# Patient Record
Sex: MALE | Race: WHITE | Employment: FULL TIME | URBAN - METROPOLITAN AREA
[De-identification: names, ages, dates, MRNs, and addresses within clinical notes are randomized per-mention and may not be internally consistent; named-entity substitution may affect disease eponyms.]

---

## 2017-09-27 PROBLEM — Z98.890 HX OF MELANOMA EXCISION: Status: ACTIVE | Noted: 2017-09-27

## 2017-09-27 PROBLEM — Z85.820 HX OF MELANOMA EXCISION: Status: ACTIVE | Noted: 2017-09-27

## 2017-09-27 PROBLEM — I10 ESSENTIAL HYPERTENSION: Status: ACTIVE | Noted: 2017-09-27

## 2017-09-27 PROBLEM — G47.30 SLEEP APNEA: Status: ACTIVE | Noted: 2017-09-27

## 2017-09-28 ENCOUNTER — OFFICE VISIT (OUTPATIENT)
Dept: FAMILY MEDICINE CLINIC | Age: 52
End: 2017-09-28

## 2017-09-28 VITALS
HEART RATE: 51 BPM | OXYGEN SATURATION: 98 % | RESPIRATION RATE: 16 BRPM | DIASTOLIC BLOOD PRESSURE: 76 MMHG | BODY MASS INDEX: 41.16 KG/M2 | SYSTOLIC BLOOD PRESSURE: 124 MMHG | WEIGHT: 294 LBS | HEIGHT: 71 IN

## 2017-09-28 DIAGNOSIS — Z23 NEED FOR INFLUENZA VACCINATION: ICD-10-CM

## 2017-09-28 DIAGNOSIS — G47.30 SLEEP APNEA, UNSPECIFIED TYPE: ICD-10-CM

## 2017-09-28 DIAGNOSIS — M25.561 CHRONIC PAIN OF RIGHT KNEE: ICD-10-CM

## 2017-09-28 DIAGNOSIS — Z11.4 SCREENING FOR HIV WITHOUT PRESENCE OF RISK FACTORS: ICD-10-CM

## 2017-09-28 DIAGNOSIS — Z11.59 ENCOUNTER FOR HEPATITIS C SCREENING TEST FOR LOW RISK PATIENT: ICD-10-CM

## 2017-09-28 DIAGNOSIS — I10 ESSENTIAL HYPERTENSION: Primary | ICD-10-CM

## 2017-09-28 DIAGNOSIS — Z98.890 HX OF MELANOMA EXCISION: ICD-10-CM

## 2017-09-28 DIAGNOSIS — Z12.11 SCREEN FOR COLON CANCER: ICD-10-CM

## 2017-09-28 DIAGNOSIS — G89.29 CHRONIC PAIN OF RIGHT KNEE: ICD-10-CM

## 2017-09-28 DIAGNOSIS — H91.93 HIGH FREQUENCY HEARING LOSS, BILATERAL: ICD-10-CM

## 2017-09-28 DIAGNOSIS — Z85.820 HX OF MELANOMA EXCISION: ICD-10-CM

## 2017-09-28 PROBLEM — H91.90 HIGH FREQUENCY HEARING LOSS: Status: ACTIVE | Noted: 2017-09-28

## 2017-09-28 LAB
ALBUMIN SERPL-MCNC: 4.5 G/DL (ref 3.4–5)
ALP BLD-CCNC: 63 U/L (ref 40–129)
ALT SERPL-CCNC: 19 U/L (ref 10–40)
ANION GAP SERPL CALCULATED.3IONS-SCNC: 14 MMOL/L (ref 3–16)
AST SERPL-CCNC: 20 U/L (ref 15–37)
BILIRUB SERPL-MCNC: 1 MG/DL (ref 0–1)
BILIRUBIN DIRECT: <0.2 MG/DL (ref 0–0.3)
BILIRUBIN, INDIRECT: NORMAL MG/DL (ref 0–1)
BUN BLDV-MCNC: 18 MG/DL (ref 7–20)
CALCIUM SERPL-MCNC: 9.4 MG/DL (ref 8.3–10.6)
CHLORIDE BLD-SCNC: 97 MMOL/L (ref 99–110)
CHOLESTEROL, TOTAL: 126 MG/DL (ref 0–199)
CO2: 24 MMOL/L (ref 21–32)
CREAT SERPL-MCNC: 0.8 MG/DL (ref 0.9–1.3)
GFR AFRICAN AMERICAN: >60
GFR NON-AFRICAN AMERICAN: >60
GLUCOSE BLD-MCNC: 95 MG/DL (ref 70–99)
HCT VFR BLD CALC: 40.1 % (ref 40.5–52.5)
HDLC SERPL-MCNC: 40 MG/DL (ref 40–60)
HEMOGLOBIN: 14 G/DL (ref 13.5–17.5)
HEPATITIS C ANTIBODY INTERPRETATION: NORMAL
LDL CHOLESTEROL CALCULATED: 65 MG/DL
MCH RBC QN AUTO: 30.8 PG (ref 26–34)
MCHC RBC AUTO-ENTMCNC: 35 G/DL (ref 31–36)
MCV RBC AUTO: 88.1 FL (ref 80–100)
PDW BLD-RTO: 14.1 % (ref 12.4–15.4)
PLATELET # BLD: 247 K/UL (ref 135–450)
PMV BLD AUTO: 7.8 FL (ref 5–10.5)
POTASSIUM SERPL-SCNC: 4.2 MMOL/L (ref 3.5–5.1)
RBC # BLD: 4.55 M/UL (ref 4.2–5.9)
SODIUM BLD-SCNC: 135 MMOL/L (ref 136–145)
TOTAL PROTEIN: 7.5 G/DL (ref 6.4–8.2)
TRIGL SERPL-MCNC: 105 MG/DL (ref 0–150)
VLDLC SERPL CALC-MCNC: 21 MG/DL
WBC # BLD: 6.9 K/UL (ref 4–11)

## 2017-09-28 PROCEDURE — 90471 IMMUNIZATION ADMIN: CPT | Performed by: INTERNAL MEDICINE

## 2017-09-28 PROCEDURE — 90688 IIV4 VACCINE SPLT 0.5 ML IM: CPT | Performed by: INTERNAL MEDICINE

## 2017-09-28 PROCEDURE — 99214 OFFICE O/P EST MOD 30 MIN: CPT | Performed by: INTERNAL MEDICINE

## 2017-09-28 PROCEDURE — 92551 PURE TONE HEARING TEST AIR: CPT | Performed by: INTERNAL MEDICINE

## 2017-09-28 PROCEDURE — 36415 COLL VENOUS BLD VENIPUNCTURE: CPT | Performed by: INTERNAL MEDICINE

## 2017-09-28 RX ORDER — LISINOPRIL 20 MG/1
20 TABLET ORAL DAILY
Qty: 90 TABLET | Refills: 0 | Status: SHIPPED | OUTPATIENT
Start: 2017-09-28 | End: 2018-01-17 | Stop reason: SDUPTHER

## 2017-09-28 ASSESSMENT — PATIENT HEALTH QUESTIONNAIRE - PHQ9
SUM OF ALL RESPONSES TO PHQ9 QUESTIONS 1 & 2: 0
2. FEELING DOWN, DEPRESSED OR HOPELESS: 0
1. LITTLE INTEREST OR PLEASURE IN DOING THINGS: 0
SUM OF ALL RESPONSES TO PHQ QUESTIONS 1-9: 0

## 2017-09-29 LAB
ESTIMATED AVERAGE GLUCOSE: 108.3 MG/DL
HBA1C MFR BLD: 5.4 %
HIV-1 AND HIV-2 ANTIBODIES: NORMAL

## 2017-10-04 ENCOUNTER — NURSE ONLY (OUTPATIENT)
Dept: FAMILY MEDICINE CLINIC | Age: 52
End: 2017-10-04

## 2017-10-04 DIAGNOSIS — Z12.11 SCREEN FOR COLON CANCER: ICD-10-CM

## 2017-10-04 LAB
CONTROL: NORMAL
HEMOCCULT STL QL: NEGATIVE

## 2017-10-04 PROCEDURE — 82274 ASSAY TEST FOR BLOOD FECAL: CPT | Performed by: INTERNAL MEDICINE

## 2018-01-17 ENCOUNTER — OFFICE VISIT (OUTPATIENT)
Dept: FAMILY MEDICINE CLINIC | Age: 53
End: 2018-01-17

## 2018-01-17 VITALS
OXYGEN SATURATION: 95 % | WEIGHT: 299 LBS | HEART RATE: 61 BPM | RESPIRATION RATE: 16 BRPM | BODY MASS INDEX: 41.86 KG/M2 | HEIGHT: 71 IN | DIASTOLIC BLOOD PRESSURE: 90 MMHG | SYSTOLIC BLOOD PRESSURE: 140 MMHG

## 2018-01-17 DIAGNOSIS — M25.512 LEFT SHOULDER PAIN, UNSPECIFIED CHRONICITY: Primary | ICD-10-CM

## 2018-01-17 DIAGNOSIS — M54.12 CERVICAL RADICULOPATHY: ICD-10-CM

## 2018-01-17 DIAGNOSIS — Z98.890 HX OF MELANOMA EXCISION: ICD-10-CM

## 2018-01-17 DIAGNOSIS — G47.30 SLEEP APNEA, UNSPECIFIED TYPE: ICD-10-CM

## 2018-01-17 DIAGNOSIS — I10 ESSENTIAL HYPERTENSION: ICD-10-CM

## 2018-01-17 DIAGNOSIS — Z85.820 HX OF MELANOMA EXCISION: ICD-10-CM

## 2018-01-17 PROBLEM — M47.816 DEGENERATIVE JOINT DISEASE (DJD) OF LUMBAR SPINE: Status: ACTIVE | Noted: 2018-01-17

## 2018-01-17 PROCEDURE — 99213 OFFICE O/P EST LOW 20 MIN: CPT | Performed by: INTERNAL MEDICINE

## 2018-01-17 RX ORDER — MELOXICAM 15 MG/1
TABLET ORAL
Qty: 30 TABLET | Refills: 0 | Status: SHIPPED | OUTPATIENT
Start: 2018-01-17 | End: 2018-03-29

## 2018-01-17 RX ORDER — PREDNISONE 20 MG/1
TABLET ORAL
Qty: 4 TABLET | Refills: 0 | Status: SHIPPED | OUTPATIENT
Start: 2018-01-17 | End: 2018-03-29

## 2018-01-17 RX ORDER — LISINOPRIL 20 MG/1
20 TABLET ORAL DAILY
Qty: 90 TABLET | Refills: 0 | Status: SHIPPED | OUTPATIENT
Start: 2018-01-17 | End: 2018-03-29 | Stop reason: SDUPTHER

## 2018-01-17 NOTE — PROGRESS NOTES
HPI: Javy Brown presents for evaluation and management of left arm pain. Chronic health issues include history of melanoma, hypertension, sleep apnea, degenerative joint disease, obesity,    Noted the onset of left shoulder discomfort about 2 weeks ago. Pain was a 6-8 out of 10. Was worked with picking items up. No weakness. Did develop radicular pain along the ulnar aspect on the left. No acute trauma. Has had a neuropathy on the right associated with epicondylitis however has no elbow pain. He denies any neck pain. No dysarthria, headache, or weakness. No paresthesias elsewhere. Pain was improving until this morning when woken from sleep. No change in his sleeping position. Has not used any medicine for this. 10 days ago onset of head congestion and cough. Seems to be improving. Positive rhinorrhea. No fevers or chills. Taking Robitussin-DM. Melanoma  2010 with negative nodes and negative metastasis. Transient interferon treatment ×4. Follows up every 6 months with dermatology. Vidhi in November 2017. No current concerns. Hypertension 2005. Lisinopril 20 mg daily. No functional testing. Mild elevation of cholesterol. Positive CPAP and compliant. No chest pain palpitations. Mild dyslipidemia.     Sleep apnea 2006  compliant with machine. Knee pain intermittently. History of trauma and effusion. Tylenol when necessary. Knows to lose weight.      Weight 307 to 287 with 90 minutes of daily exercise and cutting back on portions. Is back up since stressful month. Has been eating more. Knows to get back on track. Positive knee strain and weight is up some. . Rare TENA, + sleep apnea. Left knee pain.           PMH:   sleep study      MEDS: lisinopril 20, vit d 4000 daily     Allergy percocet itch     SH: step son and wife's sister ( handicap). .  malignance tech. ( hx electrition)  No tobacco. No alcohol. No std concern. FH: +skin cancer,MGM liver cancer,maternal uncle ?  Colon cancer,  lung cancer, DM          No prostate cancer suicides or addictions.     Review of systems: Positive snoring and apnea. Decreased hearing on screening. Rhinitis. Rare GE reflux. Denies any wheezing pneumonias or abnormal chest x-rays. Positive shortness of breath with running. No bloody stools constipation or diarrhea. No kidney stones or known prostatism. No concerns with STD. Positive melanoma. No suspicious skin lesions today. Denies any spells with sadness or depression other than when he was on interferon therapy. Rare headaches. Positive allergies. DJD spine and coccygeal pain. 12 point ROS reviewed and negative other than mentioned above  Allergies   Allergen Reactions    Oxycodone-Acetaminophen Rash       Outpatient Prescriptions Marked as Taking for the 1/17/18 encounter (Office Visit) with Layne Monroy MD   Medication Sig Dispense Refill    lisinopril (PRINIVIL;ZESTRIL) 20 MG tablet Take 1 tablet by mouth daily 90 tablet 0    Cholecalciferol (VITAMIN D3) 5000 UNITS TABS Take by mouth               Past Medical History:   Diagnosis Date    Hx of melanoma excision 9/27/2017    Hypertension     Sleep apnea        No past surgical history on file. Social History     Social History    Marital status:      Spouse name: N/A    Number of children: N/A    Years of education: N/A     Occupational History    Not on file. Social History Main Topics    Smoking status: Never Smoker    Smokeless tobacco: Never Used    Alcohol use No    Drug use: Unknown    Sexual activity: Not on file     Other Topics Concern    Not on file     Social History Narrative    No narrative on file       No family history on file.         Review of Systems        Objective     BP (!) 140/80   Pulse 61   Resp 16   Ht 5' 11\" (1.803 m)   Wt 299 lb (135.6 kg)   SpO2 95% Comment: RA  BMI 41.70 kg/m²         Wt Readings from Last 3 Encounters:   01/17/18 299 lb (135.6 kg)   09/28/17 294 lb (133.4 kg) 06/22/16 (!) 307 lb (139.3 kg)       Physical Exam     NAD alert and cooperative Woman cough  HEENT: No cervical spine tenderness. Good range of motion of the neck. Throat small hypopharynx. No oral masses. Full neck. Lungs are clear. Good I:E ratio without any wheezes rales or rhonchi  Cardiovascular exam regular rate and rhythm without any murmur click  Positive obesity without any point tenderness or mass. Good range of motion of left shoulder. Mild tenderness to palpation before meals joint. No suspicious skin lesions. Supraspinatus tenderness. No pain in the epicondyles. Strength is 5 out of 5. DTRs are equal. No pronator drift. Dry fingers with cracking  No suspicious moles      Chemistry        Component Value Date/Time     (L) 09/28/2017 1608    K 4.2 09/28/2017 1608    CL 97 (L) 09/28/2017 1608    CO2 24 09/28/2017 1608    BUN 18 09/28/2017 1608    CREATININE 0.8 (L) 09/28/2017 1608        Component Value Date/Time    CALCIUM 9.4 09/28/2017 1608    ALKPHOS 63 09/28/2017 1608    AST 20 09/28/2017 1608    ALT 19 09/28/2017 1608    BILITOT 1.0 09/28/2017 1608            Lab Results   Component Value Date    WBC 6.9 09/28/2017    HGB 14.0 09/28/2017    HCT 40.1 (L) 09/28/2017    MCV 88.1 09/28/2017     09/28/2017     Lab Results   Component Value Date    LABA1C 5.4 09/28/2017     Lab Results   Component Value Date    .3 09/28/2017     Lab Results   Component Value Date    LABA1C 5.4 09/28/2017     No components found for: CHLPL  Lab Results   Component Value Date    TRIG 105 09/28/2017     Lab Results   Component Value Date    HDL 40 09/28/2017     Lab Results   Component Value Date    LDLCALC 65 09/28/2017     Lab Results   Component Value Date    LABVLDL 21 09/28/2017       Old labs and records reviewed or requested  Discussed past lab and studies with patient     1. Left shoulder pain, unspecified chronicity     2. Hx of melanoma excision     3. Sleep apnea, unspecified type     4. Essential hypertension  lisinopril (PRINIVIL;ZESTRIL) 20 MG tablet   5. Body mass index (BMI) of 40.0 to 44.9 in adult [facility])     6. Cervical radiculopathy       Left shoulder pain with apparent lower involvement. No weakness fasciculations or neurologic abnormality currently. We'll try a short course of steroids and nonsteroidal anti-inflammatory follow-up if not resolved. History melanoma. Following up with dermatology    Sleep apnea compliant    Elevated blood pressure today however is been recently ill. We'll recheck blood pressure in 2 weeks. Adjustment if still high. Obesity. We'll start with his weight loss and exercise in the near future    Return in about 2 weeks (around 1/31/2018), or bp check MA or me if still pain. Diagnosis and treatment discussed.   Possible side effects of medication reviewed  Patients questions answered  Follow up understood  Pt aware if they are not contacted about any test results , this does not mean they are normal.  They should call

## 2018-01-31 ENCOUNTER — NURSE ONLY (OUTPATIENT)
Dept: FAMILY MEDICINE CLINIC | Age: 53
End: 2018-01-31

## 2018-01-31 VITALS — DIASTOLIC BLOOD PRESSURE: 68 MMHG | SYSTOLIC BLOOD PRESSURE: 116 MMHG

## 2018-01-31 DIAGNOSIS — Z01.30 BP CHECK: Primary | ICD-10-CM

## 2018-01-31 NOTE — PROGRESS NOTES
Bp seen today as nurse visit for bp check. Patient taking 1 lisinopril at night. Bp was normal and advised to continue medication as prescribed.  Dr. Hiro Morgan informed bp normal.

## 2018-03-29 ENCOUNTER — OFFICE VISIT (OUTPATIENT)
Dept: FAMILY MEDICINE CLINIC | Age: 53
End: 2018-03-29

## 2018-03-29 VITALS
OXYGEN SATURATION: 96 % | DIASTOLIC BLOOD PRESSURE: 74 MMHG | HEIGHT: 71 IN | WEIGHT: 311 LBS | BODY MASS INDEX: 43.54 KG/M2 | SYSTOLIC BLOOD PRESSURE: 114 MMHG | RESPIRATION RATE: 12 BRPM | HEART RATE: 73 BPM

## 2018-03-29 DIAGNOSIS — G47.30 SLEEP APNEA, UNSPECIFIED TYPE: ICD-10-CM

## 2018-03-29 DIAGNOSIS — Z85.820 HX OF MELANOMA EXCISION: ICD-10-CM

## 2018-03-29 DIAGNOSIS — M25.512 PAIN IN JOINT OF LEFT SHOULDER: ICD-10-CM

## 2018-03-29 DIAGNOSIS — I10 ESSENTIAL HYPERTENSION: Primary | ICD-10-CM

## 2018-03-29 DIAGNOSIS — F43.21 ADJUSTMENT DISORDER WITH DEPRESSED MOOD: ICD-10-CM

## 2018-03-29 DIAGNOSIS — Z98.890 HX OF MELANOMA EXCISION: ICD-10-CM

## 2018-03-29 LAB
ANION GAP SERPL CALCULATED.3IONS-SCNC: 17 MMOL/L (ref 3–16)
BUN BLDV-MCNC: 20 MG/DL (ref 7–20)
CALCIUM SERPL-MCNC: 8.9 MG/DL (ref 8.3–10.6)
CHLORIDE BLD-SCNC: 100 MMOL/L (ref 99–110)
CO2: 22 MMOL/L (ref 21–32)
CREAT SERPL-MCNC: 0.8 MG/DL (ref 0.9–1.3)
GFR AFRICAN AMERICAN: >60
GFR NON-AFRICAN AMERICAN: >60
GLUCOSE BLD-MCNC: 106 MG/DL (ref 70–99)
POTASSIUM SERPL-SCNC: 4.2 MMOL/L (ref 3.5–5.1)
SODIUM BLD-SCNC: 139 MMOL/L (ref 136–145)

## 2018-03-29 PROCEDURE — 99214 OFFICE O/P EST MOD 30 MIN: CPT | Performed by: INTERNAL MEDICINE

## 2018-03-29 PROCEDURE — 36415 COLL VENOUS BLD VENIPUNCTURE: CPT | Performed by: INTERNAL MEDICINE

## 2018-03-29 RX ORDER — LISINOPRIL 20 MG/1
20 TABLET ORAL DAILY
Qty: 90 TABLET | Refills: 2 | Status: SHIPPED | OUTPATIENT
Start: 2018-03-29 | End: 2018-08-20
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
## 2018-03-29 NOTE — PATIENT INSTRUCTIONS
eating habits. · Manage stress. If you have a lot of stress in your life, it can be hard to focus on making healthy changes to your daily habits. · Track your food and activity. You are likely to do better at losing weight if you keep track of what you eat and what you do. Follow-up care is a key part of your treatment and safety. Be sure to make and go to all appointments, and call your doctor if you are having problems. It's also a good idea to know your test results and keep a list of the medicines you take. Where can you learn more? Go to https://FeeligopeAlohar Mobileeweb.AGILE customer insight. org and sign in to your [employer] account. Enter A121 in the [employer] box to learn more about \"Learning About Low-Carbohydrate Diets for Weight Loss. \"     If you do not have an account, please click on the \"Sign Up Now\" link. Current as of: May 12, 2017  Content Version: 11.5  © 4863-9713 Healthwise, Incorporated. Care instructions adapted under license by [facility] ([facility]). If you have questions about a medical condition or this instruction, always ask your healthcare professional. [place] [address] any warranty or liability for your use of this information.

## 2018-05-02 ENCOUNTER — OFFICE VISIT (OUTPATIENT)
Dept: FAMILY MEDICINE CLINIC | Age: 53
End: 2018-05-02

## 2018-05-02 VITALS
SYSTOLIC BLOOD PRESSURE: 129 MMHG | HEIGHT: 71 IN | WEIGHT: 310 LBS | DIASTOLIC BLOOD PRESSURE: 83 MMHG | BODY MASS INDEX: 43.4 KG/M2 | RESPIRATION RATE: 18 BRPM | OXYGEN SATURATION: 96 % | HEART RATE: 53 BPM

## 2018-05-02 DIAGNOSIS — Z85.820 HX OF MELANOMA EXCISION: ICD-10-CM

## 2018-05-02 DIAGNOSIS — Z98.890 HX OF MELANOMA EXCISION: ICD-10-CM

## 2018-05-02 DIAGNOSIS — G47.30 SLEEP APNEA, UNSPECIFIED TYPE: ICD-10-CM

## 2018-05-02 DIAGNOSIS — M47.816 OSTEOARTHRITIS OF LUMBAR SPINE, UNSPECIFIED SPINAL OSTEOARTHRITIS COMPLICATION STATUS: ICD-10-CM

## 2018-05-02 DIAGNOSIS — I10 ESSENTIAL HYPERTENSION: ICD-10-CM

## 2018-05-02 DIAGNOSIS — F43.21 ADJUSTMENT DISORDER WITH DEPRESSED MOOD: ICD-10-CM

## 2018-05-02 PROCEDURE — 99213 OFFICE O/P EST LOW 20 MIN: CPT | Performed by: INTERNAL MEDICINE

## 2018-05-02 RX ORDER — MELOXICAM 15 MG/1
TABLET ORAL
Qty: 30 TABLET | Refills: 2 | Status: SHIPPED | OUTPATIENT
Start: 2018-05-02 | End: 2019-01-30 | Stop reason: ALTCHOICE

## 2018-08-20 ENCOUNTER — OFFICE VISIT (OUTPATIENT)
Dept: FAMILY MEDICINE CLINIC | Age: 53
End: 2018-08-20

## 2018-08-20 VITALS
HEIGHT: 71 IN | RESPIRATION RATE: 16 BRPM | OXYGEN SATURATION: 97 % | WEIGHT: 314 LBS | DIASTOLIC BLOOD PRESSURE: 90 MMHG | BODY MASS INDEX: 43.96 KG/M2 | HEART RATE: 72 BPM | SYSTOLIC BLOOD PRESSURE: 138 MMHG

## 2018-08-20 DIAGNOSIS — Z12.11 SCREEN FOR COLON CANCER: ICD-10-CM

## 2018-08-20 DIAGNOSIS — Z98.890 HX OF MELANOMA EXCISION: ICD-10-CM

## 2018-08-20 DIAGNOSIS — Z85.820 HX OF MELANOMA EXCISION: ICD-10-CM

## 2018-08-20 DIAGNOSIS — M47.816 OSTEOARTHRITIS OF LUMBAR SPINE, UNSPECIFIED SPINAL OSTEOARTHRITIS COMPLICATION STATUS: ICD-10-CM

## 2018-08-20 DIAGNOSIS — G47.30 SLEEP APNEA, UNSPECIFIED TYPE: ICD-10-CM

## 2018-08-20 DIAGNOSIS — I10 ESSENTIAL HYPERTENSION: ICD-10-CM

## 2018-08-20 PROCEDURE — 99213 OFFICE O/P EST LOW 20 MIN: CPT | Performed by: INTERNAL MEDICINE

## 2018-08-20 RX ORDER — LISINOPRIL AND HYDROCHLOROTHIAZIDE 20; 12.5 MG/1; MG/1
1 TABLET ORAL DAILY
Qty: 30 TABLET | Refills: 0 | Status: SHIPPED | OUTPATIENT
Start: 2018-08-20 | End: 2018-09-05 | Stop reason: SDUPTHER

## 2018-08-20 NOTE — PROGRESS NOTES
CALCIUM 8.9 03/29/2018 0827    ALKPHOS 63 09/28/2017 1608    AST 20 09/28/2017 1608    ALT 19 09/28/2017 1608    BILITOT 1.0 09/28/2017 1608            Lab Results   Component Value Date    WBC 6.9 09/28/2017    HGB 14.0 09/28/2017    HCT 40.1 (L) 09/28/2017    MCV 88.1 09/28/2017     09/28/2017     Lab Results   Component Value Date    LABA1C 5.4 09/28/2017     Lab Results   Component Value Date    .3 09/28/2017     Lab Results   Component Value Date    LABA1C 5.4 09/28/2017     No components found for: CHLPL  Lab Results   Component Value Date    TRIG 105 09/28/2017     Lab Results   Component Value Date    HDL 40 09/28/2017     Lab Results   Component Value Date    LDLCALC 65 09/28/2017     Lab Results   Component Value Date    LABVLDL 21 09/28/2017       Old labs and records reviewed or requested  Discussed past lab and studies with patient      Diagnosis Orders   1. Body mass index (BMI) of 40.0 to 44.9 in adult [facility])     2. Hx of melanoma excision     3. Sleep apnea, unspecified type     4. Essential hypertension  Basic Metabolic Panel   5. Osteoarthritis of lumbar spine, unspecified spinal osteoarthritis complication status  Vitamin D 25 Hydroxy   6. Screen for colon cancer  POCT Fecal Immunochemical Test (FIT)       BMI 44. Advised on low carb diet and exercise decrease calories. Blood pressure not at goal today. Follow-up blood pressure check in 2 weeks on lisinopril hydrochlorothiazide. Laboratories to be obtained then. Sleep apnea compliant. Osteoarthritis lumbar spine. Low back exercises as needed physical therapy interest    Due for colon screening later this year. Card given. Return in about 2 weeks (around 9/3/2018), or lab and BP. Diagnosis and treatment discussed.   Possible side effects of medication reviewed  Patients questions answered  Follow up understood  Pt aware if they are not contacted about any test results , this does not mean they are normal.  They should call

## 2018-08-20 NOTE — PATIENT INSTRUCTIONS
Patient Education        Learning About Low-Carbohydrate Diets for Weight Loss  What is a low-carbohydrate diet? Low-carb diets avoid foods that are high in carbohydrate. These high-carb foods include pasta, bread, rice, cereal, fruits, and starchy vegetables. Instead, these diets usually have you eat foods that are high in fat and protein. Many people lose weight quickly on a low-carb diet. But the early weight loss is water. People on this diet often gain the weight back after they start eating carbs again. Not all diet plans are safe or work well. A lot of the evidence shows that low-carb diets aren't healthy. That's because these diets often don't include healthy foods like fruits and vegetables. Losing weight safely means balancing protein, fat, and carbs with every meal and snack. And low-carb diets don't always provide the vitamins, minerals, and fiber you need. If you have a serious medical condition, talk to your doctor before you try any diet. These conditions include kidney disease, heart disease, type 2 diabetes, high cholesterol, and high blood pressure. If you are pregnant, it may not be safe for your baby if you are on a low-carb diet. How can you lose weight safely? You might have heard that a diet plan helped another person lose weight. But that doesn't mean that it will work for you. It is very hard to stay on a diet that includes lots of big changes in your eating habits. If you want to get to a healthy weight and stay there, making healthy lifestyle changes will often work better than dieting. These steps can help. · Make a plan for change. Work with your doctor to create a plan that is right for you. · See a dietitian. He or she can show you how to make healthy changes in your eating habits. · Manage stress. If you have a lot of stress in your life, it can be hard to focus on making healthy changes to your daily habits. · Track your food and activity.  You are likely to do better at make and go to all appointments, and call your doctor if you are having problems. It's also a good idea to know your test results and keep a list of the medicines you take. How can you care for yourself at home? · Set realistic goals. Many people expect to lose much more weight than is likely. A weight loss of 5% to 10% of your body weight may be enough to improve your health. · Get family and friends involved to provide support. Talk to them about why you are trying to lose weight, and ask them to help. They can help by participating in exercise and having meals with you, even if they may be eating something different. · Find what works best for you. If you do not have time or do not like to cook, a program that offers meal replacement bars or shakes may be better for you. Or if you like to prepare meals, finding a plan that includes daily menus and recipes may be best.  · Ask your doctor about other health professionals who can help you achieve your weight loss goals. ¨ A dietitian can help you make healthy changes in your diet. ¨ An exercise specialist or  can help you develop a safe and effective exercise program.  ¨ A counselor or psychiatrist can help you cope with issues such as depression, anxiety, or family problems that can make it hard to focus on weight loss. · Consider joining a support group for people who are trying to lose weight. Your doctor can suggest groups in your area. Where can you learn more? Go to https://MobiciouscorkyTynker.Tembo Studio. org and sign in to your [employer] account. Enter N138 in the [facility] box to learn more about \"Starting a Weight Loss Plan: Care Instructions. \"     If you do not have an account, please click on the \"Sign Up Now\" link. Current as of: October 9, 2017  Content Version: 11.7  © 0320-0433 rumr, Incorporated. Care instructions adapted under license by [facility]  If you have questions about a medical condition or this

## 2018-09-04 ENCOUNTER — NURSE ONLY (OUTPATIENT)
Dept: FAMILY MEDICINE CLINIC | Age: 53
End: 2018-09-04

## 2018-09-04 VITALS — SYSTOLIC BLOOD PRESSURE: 138 MMHG | DIASTOLIC BLOOD PRESSURE: 82 MMHG

## 2018-09-04 DIAGNOSIS — Z12.11 SCREEN FOR COLON CANCER: ICD-10-CM

## 2018-09-04 DIAGNOSIS — I10 ESSENTIAL HYPERTENSION: ICD-10-CM

## 2018-09-04 DIAGNOSIS — M47.816 OSTEOARTHRITIS OF LUMBAR SPINE, UNSPECIFIED SPINAL OSTEOARTHRITIS COMPLICATION STATUS: ICD-10-CM

## 2018-09-04 LAB
ANION GAP SERPL CALCULATED.3IONS-SCNC: 16 MMOL/L (ref 3–16)
BUN BLDV-MCNC: 17 MG/DL (ref 7–20)
CALCIUM SERPL-MCNC: 9.4 MG/DL (ref 8.3–10.6)
CHLORIDE BLD-SCNC: 94 MMOL/L (ref 99–110)
CO2: 23 MMOL/L (ref 21–32)
CREAT SERPL-MCNC: 1 MG/DL (ref 0.9–1.3)
GFR AFRICAN AMERICAN: >60
GFR NON-AFRICAN AMERICAN: >60
GLUCOSE BLD-MCNC: 109 MG/DL (ref 70–99)
POTASSIUM SERPL-SCNC: 4 MMOL/L (ref 3.5–5.1)
SODIUM BLD-SCNC: 133 MMOL/L (ref 136–145)
VITAMIN D 25-HYDROXY: 37.9 NG/ML

## 2018-09-04 PROCEDURE — 36415 COLL VENOUS BLD VENIPUNCTURE: CPT | Performed by: INTERNAL MEDICINE

## 2018-09-05 RX ORDER — LISINOPRIL AND HYDROCHLOROTHIAZIDE 20; 12.5 MG/1; MG/1
1 TABLET ORAL DAILY
Qty: 90 TABLET | Refills: 2 | Status: SHIPPED | OUTPATIENT
Start: 2018-09-05 | End: 2018-09-06 | Stop reason: SDUPTHER

## 2018-09-06 RX ORDER — LISINOPRIL AND HYDROCHLOROTHIAZIDE 20; 12.5 MG/1; MG/1
1 TABLET ORAL DAILY
Qty: 90 TABLET | Refills: 2 | Status: SHIPPED | OUTPATIENT
Start: 2018-09-06 | End: 2019-09-10 | Stop reason: SDUPTHER

## 2019-01-16 ENCOUNTER — TELEPHONE (OUTPATIENT)
Dept: INTERNAL MEDICINE CLINIC | Age: 54
End: 2019-01-16

## 2019-01-30 ENCOUNTER — OFFICE VISIT (OUTPATIENT)
Dept: FAMILY MEDICINE CLINIC | Age: 54
End: 2019-01-30
Payer: COMMERCIAL

## 2019-01-30 VITALS
OXYGEN SATURATION: 98 % | HEIGHT: 71 IN | RESPIRATION RATE: 16 BRPM | SYSTOLIC BLOOD PRESSURE: 129 MMHG | BODY MASS INDEX: 43.12 KG/M2 | WEIGHT: 308 LBS | HEART RATE: 73 BPM | DIASTOLIC BLOOD PRESSURE: 85 MMHG

## 2019-01-30 DIAGNOSIS — I10 ESSENTIAL HYPERTENSION: ICD-10-CM

## 2019-01-30 DIAGNOSIS — K64.9 BLEEDING HEMORRHOID: Primary | ICD-10-CM

## 2019-01-30 DIAGNOSIS — Z85.820 HX OF MELANOMA EXCISION: ICD-10-CM

## 2019-01-30 DIAGNOSIS — G47.30 SLEEP APNEA, UNSPECIFIED TYPE: ICD-10-CM

## 2019-01-30 DIAGNOSIS — Z98.890 HX OF MELANOMA EXCISION: ICD-10-CM

## 2019-01-30 PROBLEM — F43.21 ADJUSTMENT DISORDER WITH DEPRESSED MOOD: Status: RESOLVED | Noted: 2018-05-02 | Resolved: 2019-01-30

## 2019-01-30 PROCEDURE — 99213 OFFICE O/P EST LOW 20 MIN: CPT | Performed by: INTERNAL MEDICINE

## 2019-01-30 RX ORDER — LIDOCAINE 50 MG/G
OINTMENT TOPICAL
Qty: 35.44 G | Refills: 1 | Status: SHIPPED | OUTPATIENT
Start: 2019-01-30 | End: 2021-04-26

## 2019-01-30 ASSESSMENT — PATIENT HEALTH QUESTIONNAIRE - PHQ9
SUM OF ALL RESPONSES TO PHQ9 QUESTIONS 1 & 2: 0
SUM OF ALL RESPONSES TO PHQ QUESTIONS 1-9: 0
1. LITTLE INTEREST OR PLEASURE IN DOING THINGS: 0
SUM OF ALL RESPONSES TO PHQ QUESTIONS 1-9: 0
2. FEELING DOWN, DEPRESSED OR HOPELESS: 0

## 2019-02-06 ENCOUNTER — OFFICE VISIT (OUTPATIENT)
Dept: FAMILY MEDICINE CLINIC | Age: 54
End: 2019-02-06
Payer: COMMERCIAL

## 2019-02-06 VITALS
OXYGEN SATURATION: 96 % | HEIGHT: 71 IN | HEART RATE: 65 BPM | DIASTOLIC BLOOD PRESSURE: 80 MMHG | WEIGHT: 308 LBS | BODY MASS INDEX: 43.12 KG/M2 | RESPIRATION RATE: 16 BRPM | SYSTOLIC BLOOD PRESSURE: 123 MMHG

## 2019-02-06 DIAGNOSIS — M25.511 ACUTE PAIN OF RIGHT SHOULDER: Primary | ICD-10-CM

## 2019-02-06 DIAGNOSIS — K64.4 EXTERNAL HEMORRHOID: ICD-10-CM

## 2019-02-06 DIAGNOSIS — I10 ESSENTIAL HYPERTENSION: ICD-10-CM

## 2019-02-06 PROCEDURE — 99213 OFFICE O/P EST LOW 20 MIN: CPT | Performed by: INTERNAL MEDICINE

## 2019-02-06 RX ORDER — MELOXICAM 15 MG/1
TABLET ORAL
Qty: 30 TABLET | Refills: 0 | Status: SHIPPED | OUTPATIENT
Start: 2019-02-06 | End: 2019-04-25 | Stop reason: ALTCHOICE

## 2019-04-25 ENCOUNTER — HOSPITAL ENCOUNTER (OUTPATIENT)
Dept: GENERAL RADIOLOGY | Age: 54
Discharge: HOME | End: 2019-04-25
Payer: COMMERCIAL

## 2019-04-25 ENCOUNTER — OFFICE VISIT (OUTPATIENT)
Dept: FAMILY MEDICINE CLINIC | Age: 54
End: 2019-04-25
Payer: COMMERCIAL

## 2019-04-25 ENCOUNTER — HOSPITAL ENCOUNTER (OUTPATIENT)
Age: 54
Discharge: HOME | End: 2019-04-25
Payer: COMMERCIAL

## 2019-04-25 VITALS
HEIGHT: 71 IN | OXYGEN SATURATION: 95 % | HEART RATE: 60 BPM | WEIGHT: 315 LBS | DIASTOLIC BLOOD PRESSURE: 75 MMHG | RESPIRATION RATE: 18 BRPM | BODY MASS INDEX: 44.1 KG/M2 | SYSTOLIC BLOOD PRESSURE: 111 MMHG

## 2019-04-25 DIAGNOSIS — M47.816 OSTEOARTHRITIS OF LUMBAR SPINE, UNSPECIFIED SPINAL OSTEOARTHRITIS COMPLICATION STATUS: ICD-10-CM

## 2019-04-25 DIAGNOSIS — R07.9 CHEST PAIN, UNSPECIFIED TYPE: Primary | ICD-10-CM

## 2019-04-25 DIAGNOSIS — R07.9 CHEST PAIN, UNSPECIFIED TYPE: ICD-10-CM

## 2019-04-25 DIAGNOSIS — I10 ESSENTIAL HYPERTENSION: ICD-10-CM

## 2019-04-25 DIAGNOSIS — Z98.890 HX OF MELANOMA EXCISION: ICD-10-CM

## 2019-04-25 DIAGNOSIS — R73.03 PREDIABETES: ICD-10-CM

## 2019-04-25 DIAGNOSIS — G47.30 SLEEP APNEA, UNSPECIFIED TYPE: ICD-10-CM

## 2019-04-25 DIAGNOSIS — Z85.820 HX OF MELANOMA EXCISION: ICD-10-CM

## 2019-04-25 LAB
ALBUMIN SERPL-MCNC: 4.3 G/DL (ref 3.4–5)
ALP BLD-CCNC: 64 U/L (ref 40–129)
ALT SERPL-CCNC: 37 U/L (ref 10–40)
ANION GAP SERPL CALCULATED.3IONS-SCNC: 13 MMOL/L (ref 3–16)
AST SERPL-CCNC: 30 U/L (ref 15–37)
BASOPHILS ABSOLUTE: 0.1 K/UL (ref 0–0.2)
BASOPHILS RELATIVE PERCENT: 0.9 %
BILIRUB SERPL-MCNC: 0.5 MG/DL (ref 0–1)
BILIRUBIN DIRECT: <0.2 MG/DL (ref 0–0.3)
BILIRUBIN, INDIRECT: NORMAL MG/DL (ref 0–1)
BUN BLDV-MCNC: 18 MG/DL (ref 7–20)
CALCIUM SERPL-MCNC: 9.4 MG/DL (ref 8.3–10.6)
CHLORIDE BLD-SCNC: 98 MMOL/L (ref 99–110)
CO2: 24 MMOL/L (ref 21–32)
CREAT SERPL-MCNC: 1 MG/DL (ref 0.9–1.3)
EOSINOPHILS ABSOLUTE: 0.1 K/UL (ref 0–0.6)
EOSINOPHILS RELATIVE PERCENT: 1.7 %
GFR AFRICAN AMERICAN: >60
GFR NON-AFRICAN AMERICAN: >60
GLUCOSE BLD-MCNC: 121 MG/DL (ref 70–99)
HCT VFR BLD CALC: 40.1 % (ref 40.5–52.5)
HEMOGLOBIN: 14 G/DL (ref 13.5–17.5)
LYMPHOCYTES ABSOLUTE: 2 K/UL (ref 1–5.1)
LYMPHOCYTES RELATIVE PERCENT: 30.5 %
MCH RBC QN AUTO: 30.6 PG (ref 26–34)
MCHC RBC AUTO-ENTMCNC: 34.9 G/DL (ref 31–36)
MCV RBC AUTO: 87.6 FL (ref 80–100)
MONOCYTES ABSOLUTE: 0.4 K/UL (ref 0–1.3)
MONOCYTES RELATIVE PERCENT: 5.7 %
NEUTROPHILS ABSOLUTE: 4 K/UL (ref 1.7–7.7)
NEUTROPHILS RELATIVE PERCENT: 61.2 %
PDW BLD-RTO: 14.3 % (ref 12.4–15.4)
PLATELET # BLD: 273 K/UL (ref 135–450)
PMV BLD AUTO: 7.2 FL (ref 5–10.5)
POTASSIUM SERPL-SCNC: 3.8 MMOL/L (ref 3.5–5.1)
RBC # BLD: 4.58 M/UL (ref 4.2–5.9)
SODIUM BLD-SCNC: 135 MMOL/L (ref 136–145)
TOTAL PROTEIN: 7.4 G/DL (ref 6.4–8.2)
WBC # BLD: 6.6 K/UL (ref 4–11)

## 2019-04-25 PROCEDURE — 99213 OFFICE O/P EST LOW 20 MIN: CPT | Performed by: INTERNAL MEDICINE

## 2019-04-25 PROCEDURE — 71046 X-RAY EXAM CHEST 2 VIEWS: CPT

## 2019-04-25 PROCEDURE — 36415 COLL VENOUS BLD VENIPUNCTURE: CPT | Performed by: INTERNAL MEDICINE

## 2019-04-25 RX ORDER — RANITIDINE 300 MG/1
300 TABLET ORAL NIGHTLY
Qty: 30 TABLET | Refills: 0 | Status: SHIPPED | OUTPATIENT
Start: 2019-04-25 | End: 2019-10-28

## 2019-04-25 NOTE — PATIENT INSTRUCTIONS
Start zantac nightly  Get chest xray  Back to the gym.   Follow up 1 month on medication  If worsening, fever, cough up blood need call  Try to cut back on calories
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
## 2019-04-26 LAB
ESTIMATED AVERAGE GLUCOSE: 119.8 MG/DL
HBA1C MFR BLD: 5.8 %

## 2019-05-22 ENCOUNTER — OFFICE VISIT (OUTPATIENT)
Dept: FAMILY MEDICINE CLINIC | Age: 54
End: 2019-05-22
Payer: COMMERCIAL

## 2019-05-22 VITALS
BODY MASS INDEX: 43.82 KG/M2 | DIASTOLIC BLOOD PRESSURE: 73 MMHG | HEART RATE: 52 BPM | RESPIRATION RATE: 16 BRPM | SYSTOLIC BLOOD PRESSURE: 109 MMHG | HEIGHT: 71 IN | OXYGEN SATURATION: 96 % | WEIGHT: 313 LBS

## 2019-05-22 DIAGNOSIS — I10 ESSENTIAL HYPERTENSION: ICD-10-CM

## 2019-05-22 DIAGNOSIS — G47.30 SLEEP APNEA, UNSPECIFIED TYPE: ICD-10-CM

## 2019-05-22 DIAGNOSIS — M54.6 CHRONIC RIGHT-SIDED THORACIC BACK PAIN: ICD-10-CM

## 2019-05-22 DIAGNOSIS — Z85.820 HX OF MELANOMA EXCISION: ICD-10-CM

## 2019-05-22 DIAGNOSIS — Z98.890 HX OF MELANOMA EXCISION: ICD-10-CM

## 2019-05-22 DIAGNOSIS — G89.29 CHRONIC RIGHT-SIDED THORACIC BACK PAIN: ICD-10-CM

## 2019-05-22 DIAGNOSIS — M47.816 OSTEOARTHRITIS OF LUMBAR SPINE, UNSPECIFIED SPINAL OSTEOARTHRITIS COMPLICATION STATUS: ICD-10-CM

## 2019-05-22 DIAGNOSIS — R73.03 PREDIABETES: ICD-10-CM

## 2019-05-22 DIAGNOSIS — R10.33 PERIUMBILICAL ABDOMINAL PAIN: ICD-10-CM

## 2019-05-22 PROCEDURE — 99213 OFFICE O/P EST LOW 20 MIN: CPT | Performed by: INTERNAL MEDICINE
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
## 2019-06-07 ENCOUNTER — HOSPITAL ENCOUNTER (OUTPATIENT)
Dept: CT IMAGING | Age: 54
Discharge: HOME | End: 2019-06-07
Payer: COMMERCIAL

## 2019-06-07 DIAGNOSIS — G89.29 CHRONIC RIGHT-SIDED THORACIC BACK PAIN: ICD-10-CM

## 2019-06-07 DIAGNOSIS — M54.6 CHRONIC RIGHT-SIDED THORACIC BACK PAIN: ICD-10-CM

## 2019-06-07 DIAGNOSIS — Z85.820 HX OF MELANOMA EXCISION: ICD-10-CM

## 2019-06-07 DIAGNOSIS — Z98.890 HX OF MELANOMA EXCISION: ICD-10-CM

## 2019-06-07 PROCEDURE — 74177 CT ABD & PELVIS W/CONTRAST: CPT

## 2019-06-07 PROCEDURE — 6360000004 HC RX CONTRAST MEDICATION: Performed by: INTERNAL MEDICINE

## 2019-06-07 RX ADMIN — IOHEXOL 50 ML: 240 INJECTION, SOLUTION INTRATHECAL; INTRAVASCULAR; INTRAVENOUS; ORAL at 08:51

## 2019-06-07 RX ADMIN — IOPAMIDOL 75 ML: 755 INJECTION, SOLUTION INTRAVENOUS at 08:51

## 2019-09-10 RX ORDER — LISINOPRIL AND HYDROCHLOROTHIAZIDE 20; 12.5 MG/1; MG/1
1 TABLET ORAL DAILY
Qty: 90 TABLET | Refills: 0 | Status: SHIPPED | OUTPATIENT
Start: 2019-09-10 | End: 2019-12-13 | Stop reason: SDUPTHER

## 2019-10-18 ENCOUNTER — TELEPHONE (OUTPATIENT)
Dept: FAMILY MEDICINE CLINIC | Age: 54
End: 2019-10-18

## 2019-10-28 ENCOUNTER — NURSE ONLY (OUTPATIENT)
Dept: FAMILY MEDICINE CLINIC | Age: 54
End: 2019-10-28
Payer: COMMERCIAL

## 2019-10-28 ENCOUNTER — OFFICE VISIT (OUTPATIENT)
Dept: FAMILY MEDICINE CLINIC | Age: 54
End: 2019-10-28
Payer: COMMERCIAL

## 2019-10-28 VITALS
WEIGHT: 307 LBS | OXYGEN SATURATION: 94 % | HEIGHT: 71 IN | HEART RATE: 56 BPM | RESPIRATION RATE: 18 BRPM | DIASTOLIC BLOOD PRESSURE: 67 MMHG | BODY MASS INDEX: 42.98 KG/M2 | SYSTOLIC BLOOD PRESSURE: 99 MMHG

## 2019-10-28 DIAGNOSIS — I10 ESSENTIAL HYPERTENSION: Primary | ICD-10-CM

## 2019-10-28 DIAGNOSIS — M54.6 CHRONIC RIGHT-SIDED THORACIC BACK PAIN: ICD-10-CM

## 2019-10-28 DIAGNOSIS — R73.03 PREDIABETES: ICD-10-CM

## 2019-10-28 DIAGNOSIS — Z12.11 SCREEN FOR COLON CANCER: ICD-10-CM

## 2019-10-28 DIAGNOSIS — Z98.890 HX OF MELANOMA EXCISION: ICD-10-CM

## 2019-10-28 DIAGNOSIS — R91.8 PULMONARY NODULES: ICD-10-CM

## 2019-10-28 DIAGNOSIS — G89.29 CHRONIC RIGHT-SIDED THORACIC BACK PAIN: ICD-10-CM

## 2019-10-28 DIAGNOSIS — Z85.820 HX OF MELANOMA EXCISION: ICD-10-CM

## 2019-10-28 DIAGNOSIS — R19.5 POSITIVE FECAL OCCULT BLOOD TEST: Primary | ICD-10-CM

## 2019-10-28 LAB
ANION GAP SERPL CALCULATED.3IONS-SCNC: 16 MMOL/L (ref 3–16)
BUN BLDV-MCNC: 18 MG/DL (ref 7–20)
CALCIUM SERPL-MCNC: 9.3 MG/DL (ref 8.3–10.6)
CHLORIDE BLD-SCNC: 100 MMOL/L (ref 99–110)
CO2: 22 MMOL/L (ref 21–32)
CONTROL: ABNORMAL
CREAT SERPL-MCNC: 0.9 MG/DL (ref 0.9–1.3)
GFR AFRICAN AMERICAN: >60
GFR NON-AFRICAN AMERICAN: >60
GLUCOSE BLD-MCNC: 116 MG/DL (ref 70–99)
HCT VFR BLD CALC: 39.3 % (ref 40.5–52.5)
HEMOCCULT STL QL: POSITIVE
HEMOGLOBIN: 13.9 G/DL (ref 13.5–17.5)
MCH RBC QN AUTO: 31.6 PG (ref 26–34)
MCHC RBC AUTO-ENTMCNC: 35.4 G/DL (ref 31–36)
MCV RBC AUTO: 89.4 FL (ref 80–100)
PDW BLD-RTO: 14 % (ref 12.4–15.4)
PLATELET # BLD: 242 K/UL (ref 135–450)
PMV BLD AUTO: 7.2 FL (ref 5–10.5)
POTASSIUM SERPL-SCNC: 4 MMOL/L (ref 3.5–5.1)
RBC # BLD: 4.4 M/UL (ref 4.2–5.9)
SODIUM BLD-SCNC: 138 MMOL/L (ref 136–145)
WBC # BLD: 5.7 K/UL (ref 4–11)

## 2019-10-28 PROCEDURE — 99214 OFFICE O/P EST MOD 30 MIN: CPT | Performed by: INTERNAL MEDICINE

## 2019-10-28 PROCEDURE — 36415 COLL VENOUS BLD VENIPUNCTURE: CPT | Performed by: INTERNAL MEDICINE

## 2019-10-28 PROCEDURE — 82274 ASSAY TEST FOR BLOOD FECAL: CPT | Performed by: INTERNAL MEDICINE

## 2019-12-06 ENCOUNTER — ANESTHESIA EVENT (OUTPATIENT)
Dept: ENDOSCOPY | Age: 54
End: 2019-12-06
Payer: COMMERCIAL

## 2019-12-09 ENCOUNTER — ANESTHESIA (OUTPATIENT)
Dept: ENDOSCOPY | Age: 54
End: 2019-12-09
Payer: COMMERCIAL

## 2019-12-09 ENCOUNTER — HOSPITAL ENCOUNTER (OUTPATIENT)
Age: 54
Setting detail: OUTPATIENT SURGERY
Discharge: HOME | End: 2019-12-09
Attending: INTERNAL MEDICINE | Admitting: INTERNAL MEDICINE
Payer: COMMERCIAL

## 2019-12-09 VITALS
SYSTOLIC BLOOD PRESSURE: 133 MMHG | WEIGHT: 313.49 LBS | OXYGEN SATURATION: 97 % | HEIGHT: 70 IN | BODY MASS INDEX: 44.88 KG/M2 | RESPIRATION RATE: 16 BRPM | HEART RATE: 64 BPM | TEMPERATURE: 97.7 F | DIASTOLIC BLOOD PRESSURE: 85 MMHG

## 2019-12-09 VITALS
RESPIRATION RATE: 21 BRPM | OXYGEN SATURATION: 96 % | DIASTOLIC BLOOD PRESSURE: 68 MMHG | SYSTOLIC BLOOD PRESSURE: 109 MMHG

## 2019-12-09 DIAGNOSIS — Z12.11 COLON CANCER SCREENING: ICD-10-CM

## 2019-12-09 DIAGNOSIS — R19.5 POSITIVE OCCULT STOOL BLOOD TEST: ICD-10-CM

## 2019-12-09 PROBLEM — Z86.010 HX OF COLONIC POLYPS: Status: ACTIVE | Noted: 2019-10-28

## 2019-12-09 PROBLEM — Z86.0100 HX OF COLONIC POLYPS: Status: ACTIVE | Noted: 2019-10-28

## 2019-12-09 PROCEDURE — 2500000003 HC RX 250 WO HCPCS: Performed by: NURSE ANESTHETIST, CERTIFIED REGISTERED

## 2019-12-09 PROCEDURE — 3609010600 HC COLONOSCOPY POLYPECTOMY SNARE/COLD BIOPSY: Performed by: INTERNAL MEDICINE

## 2019-12-09 PROCEDURE — 7100000010 HC PHASE II RECOVERY - FIRST 15 MIN: Performed by: INTERNAL MEDICINE

## 2019-12-09 PROCEDURE — 7100000011 HC PHASE II RECOVERY - ADDTL 15 MIN: Performed by: INTERNAL MEDICINE

## 2019-12-09 PROCEDURE — 7100000001 HC PACU RECOVERY - ADDTL 15 MIN: Performed by: INTERNAL MEDICINE

## 2019-12-09 PROCEDURE — 88305 TISSUE EXAM BY PATHOLOGIST: CPT

## 2019-12-09 PROCEDURE — 3700000000 HC ANESTHESIA ATTENDED CARE: Performed by: INTERNAL MEDICINE

## 2019-12-09 PROCEDURE — 6360000002 HC RX W HCPCS: Performed by: NURSE ANESTHETIST, CERTIFIED REGISTERED

## 2019-12-09 PROCEDURE — 3700000001 HC ADD 15 MINUTES (ANESTHESIA): Performed by: INTERNAL MEDICINE

## 2019-12-09 PROCEDURE — 2709999900 HC NON-CHARGEABLE SUPPLY: Performed by: INTERNAL MEDICINE

## 2019-12-09 PROCEDURE — 2580000003 HC RX 258: Performed by: NURSE ANESTHETIST, CERTIFIED REGISTERED

## 2019-12-09 PROCEDURE — 7100000000 HC PACU RECOVERY - FIRST 15 MIN: Performed by: INTERNAL MEDICINE

## 2019-12-09 PROCEDURE — 2500000003 HC RX 250 WO HCPCS: Performed by: ANESTHESIOLOGY

## 2019-12-09 RX ORDER — SODIUM CHLORIDE 9 MG/ML
INJECTION, SOLUTION INTRAVENOUS CONTINUOUS PRN
Status: DISCONTINUED | OUTPATIENT
Start: 2019-12-09 | End: 2019-12-09 | Stop reason: SDUPTHER

## 2019-12-09 RX ORDER — SODIUM CHLORIDE 9 MG/ML
INJECTION, SOLUTION INTRAVENOUS CONTINUOUS
Status: DISCONTINUED | OUTPATIENT
Start: 2019-12-09 | End: 2019-12-09 | Stop reason: HOSPADM

## 2019-12-09 RX ORDER — PROPOFOL 10 MG/ML
INJECTION, EMULSION INTRAVENOUS CONTINUOUS PRN
Status: DISCONTINUED | OUTPATIENT
Start: 2019-12-09 | End: 2019-12-09 | Stop reason: SDUPTHER

## 2019-12-09 RX ORDER — SODIUM CHLORIDE 0.9 % (FLUSH) 0.9 %
10 SYRINGE (ML) INJECTION PRN
Status: DISCONTINUED | OUTPATIENT
Start: 2019-12-09 | End: 2019-12-09 | Stop reason: HOSPADM

## 2019-12-09 RX ORDER — PROPOFOL 10 MG/ML
INJECTION, EMULSION INTRAVENOUS PRN
Status: DISCONTINUED | OUTPATIENT
Start: 2019-12-09 | End: 2019-12-09 | Stop reason: SDUPTHER

## 2019-12-09 RX ORDER — SODIUM CHLORIDE 0.9 % (FLUSH) 0.9 %
10 SYRINGE (ML) INJECTION EVERY 12 HOURS SCHEDULED
Status: DISCONTINUED | OUTPATIENT
Start: 2019-12-09 | End: 2019-12-09 | Stop reason: HOSPADM

## 2019-12-09 RX ORDER — LIDOCAINE HYDROCHLORIDE 20 MG/ML
INJECTION, SOLUTION EPIDURAL; INFILTRATION; INTRACAUDAL; PERINEURAL PRN
Status: DISCONTINUED | OUTPATIENT
Start: 2019-12-09 | End: 2019-12-09 | Stop reason: SDUPTHER

## 2019-12-09 RX ORDER — ONDANSETRON 2 MG/ML
4 INJECTION INTRAMUSCULAR; INTRAVENOUS
Status: DISCONTINUED | OUTPATIENT
Start: 2019-12-09 | End: 2019-12-09 | Stop reason: HOSPADM

## 2019-12-09 RX ADMIN — FAMOTIDINE 20 MG: 10 INJECTION, SOLUTION INTRAVENOUS at 07:38

## 2019-12-09 RX ADMIN — PROPOFOL 90 MG: 10 INJECTION, EMULSION INTRAVENOUS at 08:07

## 2019-12-09 RX ADMIN — PROPOFOL 140 MCG/KG/MIN: 10 INJECTION, EMULSION INTRAVENOUS at 08:07

## 2019-12-09 RX ADMIN — LIDOCAINE HYDROCHLORIDE 60 MG: 20 INJECTION, SOLUTION EPIDURAL; INFILTRATION; INTRACAUDAL; PERINEURAL at 08:07

## 2019-12-09 RX ADMIN — SODIUM CHLORIDE: 9 INJECTION, SOLUTION INTRAVENOUS at 07:34

## 2019-12-09 ASSESSMENT — PULMONARY FUNCTION TESTS
PIF_VALUE: 0

## 2019-12-09 ASSESSMENT — LIFESTYLE VARIABLES: SMOKING_STATUS: 0

## 2019-12-09 ASSESSMENT — PAIN SCALES - GENERAL
PAINLEVEL_OUTOF10: 0

## 2019-12-09 ASSESSMENT — ENCOUNTER SYMPTOMS: SHORTNESS OF BREATH: 0

## 2019-12-09 ASSESSMENT — PAIN - FUNCTIONAL ASSESSMENT: PAIN_FUNCTIONAL_ASSESSMENT: 0-10

## 2020-03-11 ENCOUNTER — OFFICE VISIT (OUTPATIENT)
Dept: FAMILY MEDICINE CLINIC | Age: 55
End: 2020-03-11
Payer: COMMERCIAL

## 2020-03-11 VITALS
HEART RATE: 71 BPM | BODY MASS INDEX: 44.98 KG/M2 | RESPIRATION RATE: 12 BRPM | SYSTOLIC BLOOD PRESSURE: 138 MMHG | DIASTOLIC BLOOD PRESSURE: 88 MMHG | TEMPERATURE: 97.8 F | HEIGHT: 70 IN

## 2020-03-11 LAB
ANION GAP SERPL CALCULATED.3IONS-SCNC: 12 MMOL/L (ref 3–16)
BUN BLDV-MCNC: 18 MG/DL (ref 7–20)
CALCIUM SERPL-MCNC: 9.7 MG/DL (ref 8.3–10.6)
CHLORIDE BLD-SCNC: 101 MMOL/L (ref 99–110)
CO2: 23 MMOL/L (ref 21–32)
CREAT SERPL-MCNC: 0.8 MG/DL (ref 0.9–1.3)
ESTIMATED AVERAGE GLUCOSE: 108.3 MG/DL
GFR AFRICAN AMERICAN: >60
GFR NON-AFRICAN AMERICAN: >60
GLUCOSE BLD-MCNC: 113 MG/DL (ref 70–99)
HBA1C MFR BLD: 5.4 %
POTASSIUM SERPL-SCNC: 4.3 MMOL/L (ref 3.5–5.1)
SEDIMENTATION RATE, ERYTHROCYTE: 14 MM/HR (ref 0–20)
SODIUM BLD-SCNC: 136 MMOL/L (ref 136–145)

## 2020-03-11 PROCEDURE — 99214 OFFICE O/P EST MOD 30 MIN: CPT | Performed by: INTERNAL MEDICINE

## 2020-03-11 PROCEDURE — 36415 COLL VENOUS BLD VENIPUNCTURE: CPT | Performed by: INTERNAL MEDICINE

## 2020-03-11 RX ORDER — TIZANIDINE 4 MG/1
TABLET ORAL
Qty: 30 TABLET | Refills: 1 | Status: SHIPPED | OUTPATIENT
Start: 2020-03-11 | End: 2020-10-26

## 2020-03-11 RX ORDER — CELECOXIB 200 MG/1
CAPSULE ORAL
Qty: 30 CAPSULE | Refills: 1 | Status: SHIPPED | OUTPATIENT
Start: 2020-03-11 | End: 2020-04-07

## 2020-03-11 ASSESSMENT — PATIENT HEALTH QUESTIONNAIRE - PHQ9
2. FEELING DOWN, DEPRESSED OR HOPELESS: 0
SUM OF ALL RESPONSES TO PHQ QUESTIONS 1-9: 0
SUM OF ALL RESPONSES TO PHQ9 QUESTIONS 1 & 2: 0
1. LITTLE INTEREST OR PLEASURE IN DOING THINGS: 0
SUM OF ALL RESPONSES TO PHQ QUESTIONS 1-9: 0
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
## 2020-04-02 ENCOUNTER — TELEPHONE (OUTPATIENT)
Dept: FAMILY MEDICINE CLINIC | Age: 55
End: 2020-04-02

## 2020-04-07 RX ORDER — CELECOXIB 200 MG/1
CAPSULE ORAL
Qty: 30 CAPSULE | Refills: 1 | Status: SHIPPED | OUTPATIENT
Start: 2020-04-07 | End: 2020-05-28 | Stop reason: SDUPTHER

## 2020-04-24 ENCOUNTER — TELEPHONE (OUTPATIENT)
Dept: FAMILY MEDICINE CLINIC | Age: 55
End: 2020-04-24

## 2020-04-24 NOTE — TELEPHONE ENCOUNTER
Kenisha Reynolds with [facility] called requesting an updated order for physical therapy. They  after 30 days and the last one was put in back in march.      Best call back number: [phone]

## 2020-04-27 ENCOUNTER — TELEPHONE (OUTPATIENT)
Dept: FAMILY MEDICINE CLINIC | Age: 55
End: 2020-04-27

## 2020-04-27 NOTE — TELEPHONE ENCOUNTER
Called on Friday for updated order for PT. Unable to take verbal. Needs new order put in epic. Please advise.  Best call back number is [phone]

## 2020-04-27 NOTE — PROGRESS NOTES
Patient being evaluated by a Virtual Visit (video visit) encounter to address concerns as mentioned below. A caregiver was present when appropriate. Due to this being a TeleHealth encounter (During [facility]-37 public health emergency), evaluation of the following organ systems was limited: Vitals/Constitutional/EENT/Resp/CV/GI//MS/Neuro/Skin/Heme-Lymph-Imm. Pursuant to the emergency declaration under the [facility], [address] [place] [address] and the [employer] Act, this Virtual Visit was conducted with patient's (and/or legal guardian's) consent, to reduce the patient's risk of exposure to COVID-19 and provide necessary medical care. The patient (and/or legal guardian) has also been advised to contact this office for worsening conditions or problems, and seek emergency medical treatment and/or call 911 if deemed necessary. Services were provided through a video synchronous discussion virtually to substitute for in-person clinic visit. HPI: Eze Leon presents for evaluation and management of HTN    Chronic health issues include osteoarthritis, negative node melanoma, hypertension, obesity, sleep apnea, prediabetes, adenomatous polyps, chronic thoracic pain. DJD spine      2005 hypertension. Compliant with medication no chest pain palpitations lower extremity edema or new exercise intolerance.  Resting bradycardia without near syncope or palpitations.  Mild hyperlipidemia. Last blood pressure 140s over 88. Has not checked his blood pressure at home. Does have a cuff. Abscessed tooth. Amoxicillin. Following with dentist.     Greater than 10 years of intermittent low back pain. MRI of thoracic and lumbar spine with osteoarthritis. Goes to chiropractor has intermittent flares. Motrin discontinued neck in [place] to dyspepsia. Currently taking Celebrex 200 on a as needed basis which has been effective.   Is

## 2020-04-28 ENCOUNTER — VIRTUAL VISIT (OUTPATIENT)
Dept: FAMILY MEDICINE CLINIC | Age: 55
End: 2020-04-28
Payer: COMMERCIAL

## 2020-04-28 PROCEDURE — 99213 OFFICE O/P EST LOW 20 MIN: CPT | Performed by: INTERNAL MEDICINE

## 2020-04-30 ENCOUNTER — HOSPITAL ENCOUNTER (OUTPATIENT)
Dept: PHYSICAL THERAPY | Age: 55
Setting detail: THERAPIES SERIES
Discharge: HOME | End: 2020-04-30
Payer: COMMERCIAL

## 2020-05-04 ENCOUNTER — HOSPITAL ENCOUNTER (OUTPATIENT)
Dept: PHYSICAL THERAPY | Age: 55
Setting detail: THERAPIES SERIES
Discharge: HOME | End: 2020-05-04
Payer: COMMERCIAL

## 2020-05-04 PROCEDURE — 97140 MANUAL THERAPY 1/> REGIONS: CPT | Performed by: PHYSICAL THERAPIST

## 2020-05-04 PROCEDURE — 97162 PT EVAL MOD COMPLEX 30 MIN: CPT | Performed by: PHYSICAL THERAPIST

## 2020-05-04 PROCEDURE — 97110 THERAPEUTIC EXERCISES: CPT | Performed by: PHYSICAL THERAPIST

## 2020-05-04 NOTE — PLAN OF CARE
Jan and Feb (about 3x this year). The numbness has been there for a couple years. Provocative factors: Pain at worst:  7-8/10. This pain is short lived, but it is intense. Twisting, laying on his back, standing or walking for a prolonged period. Type: [] Constant   [] Intermittent  [] Radiating [] Localized [] other: ache in the leg. Like little pins sticking him in his right leg. Light throbbing pain like his leg went to sleep when he is sleeping. It will wake him. Numbness/Tingling: from greater trochanter to lateral knee    Functional Limitations/Impairments: [x] Sitting- simona with feet on ground indefinitely, but if it's not supported or feet not on the ground, his pain is worse. [x] Standing- needs to be able to move around [x] Walking- has to stop momentarily    [] Squatting/bending  [x] Stairs           [x] ADL's  [x] Transfers- in out of bed/car [x] Sports/Recreation- wood working, computer [] Other:    Occupation/School:  . Living Status/Prior Level of Function: Independent with ADLs and IADLs,  Intermittent pain for the last 3 years at least        Standing Exam Normal Abnormal N/A Comments   Toe walk   x      Heel Walk x      Side bending  50% motion  Pt reports more symptoms/tightness going to the right   Pelvic Height       Fwd Bend- (aberrant juttering or innominate mvmt)    Decreased lumbar mobility   Extension x Increased radic with repeated motion. Good motion in general     Trendelenburg       Kemps/Quadrant       Stork       SLS/SLS with rotation                     Seated Exam Normal Abnormal N/A Comments   Pelvic Height x      Seated Rotation    25% to left. 50% to the right and tight per pt   Seated flexion       B hip IR    Right is 22.  Left is 25                 Supine Exam Normal Abnormal N/A Comments   Hip flexion x      Abduction x      ER x      IR Right bilaterally      PATRICIA/Martin       Hip scour       SLR       Crossed SLR x      Supine to sit

## 2020-05-04 NOTE — FLOWSHEET NOTE
Orthopaedics and [address] [place] [address] DR MICHEAL XIE    Physical Therapy Treatment Note/ Progress Report:           Date:  2020    Patient Name:  Georgina Espino    :  1965  MRN: [id number]  Restrictions/Precautions:    Medical/Treatment Diagnosis Information:  · Diagnosis: M47.816 (ICD-10-CM) - Osteoarthritis of lumbar spine, unspecified spinal osteoarthritis complication status.   Onset years with recent exacerbation in 2020  · Treatment Diagnosis: M54.5 - low back pain  Insurance/Certification information:  PT Insurance Information: UMR  Physician Information:  Referring Practitioner: Octavia Matthews  Has the plan of care been signed (Y/N):        []  Yes  [x]  No     Date of Patient follow up with Physician: unknown      Is this a Progress Report:     []  Yes  [x]  No        If Yes:  Date Range for reporting period:  Beginning 2020  Ending    Progress report will be due (10 Rx or 30 days whichever is less): visit 10 or 5051       Recertification will be due (POC Duration  / 90 days whichever is less): visit 10 or 2020          Visit # Insurance Allowable Auth Required    30 []  Yes [x]  No        Functional Scale: CORINNE- 18   Date assessed: 2020    Latex Allergy:  [] NO      [x] YES sensitive  Preferred Language for Healthcare:   [x] English       [] other:      Pain level:  0/10     SUBJECTIVE:  See eval    OBJECTIVE: See eval      Standing Exam Normal Abnormal N/A Comments   Toe walk         Heel Walk       Side bending       Pelvic Height       Fwd Bend- (aberrant juttering or innominate mvmt)       Extension       Trendelenburg       Kemps/Quadrant       Stork       SLS/SLS w rotation                     Seated Exam Normal Abnormal N/A Comments   Pelvic Height       Seated Rotation       Seated flexion       B hip IR                     Supine Exam Normal Abnormal N/A Comments   Hip flexion       Abduction       ER       IR       PATRICIA/Martin       Hip scour       SLR Crossed SLR       Supine to sit       SI distraction/compression       Hip thrust                     Prone Exam Normal Abnormal N/A Comments   Prone knee bend       Prone hip IR       B Achilles reflex/Pheasant       PA/Spring       Prone Instability test       Sacral Spring/thrust                       ROM LEFT RIGHT Comments   Lumbar Flex      Lumbar Ext      Side Bend      Rotation                    ROM LEFT RIGHT Comments   Hip Flexion      Hip Abd      Hip ER      Hip IR      Hip Extension      Knee Ext      Knee Flex      Hamstring Flex      Piriformis                    Strength LEFT RIGHT Comments   Multifidus      Transverse Ab      Hip Flexors      Hip Abductors      Hip Extensors                     Myotomes Normal Abnormal Comments   Hip flexion (L1-L2)      Knee extension (L2-L4)      Dorsiflexion (L4-L5)      Great Toe Ext (L5)      Ankle Eversion (S1-S2)      Ankle PF(S1-S2)          Dermatomes Normal Abnormal Comments   inguinal area (L1)       anterior mid-thigh (L2)      distal ant thigh/med knee (L3)      medial lower leg and foot (L4)      lateral lower leg and foot (L5)      posterior calf (S1)      medial calcaneus (S2)          Neural dynamic tension testing Normal Abnormal Comments   Slump Test  - Degree of knee flexion:       SLR       0-30      30-70      Femoral nerve (L2-4)        Reflexes Normal Abnormal Comments   S1-2 Seated achilles      S1-2 Prone knee bend      L3-4 Patellar tendon      C5-6 Biceps      C6 Brachioradialis      C7-8 Triceps      Clonus      Babinski      Staley's            Positional Tolerance     Standing repeated flex    Standing repeated ext    Prone laying    Prone prop    Prone laying with pillow    Supine manual B LE distraction    Supine with wedge under LE              RESTRICTIONS/PRECAUTIONS: skin CA- malignant melanoma- gets checked every 6 months- removed. HTN-managed with medication. 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  Numbness and tingling on right needs to allow for proper joint functioning as indicated by patients functional deficits. []? Progressing: []? Met: []? Not Met: []? Adjusted  3. Patient will demonstrate an increase in Strength to do ab brace independently to allow for proper functional mobility as indicated by patients Functional Deficits. []? Progressing: []? Met: []? Not Met: []? Adjusted  4. Patient will return to performing all bed mobility without increased symptoms or restriction. []? Progressing: []? Met: []? Not Met: []? Adjusted  5. Pt will report pain at worst less than or equal to 3/10. []? Progressing: []? Met: []? Not Met: []? Adjusted          Overall Progression Towards Functional goals/ Treatment Progress Update:  [] Patient is progressing as expected towards functional goals listed. [] Progression is slowed due to complexities/Impairments listed. [] Progression has been slowed due to co-morbidities. [x] Plan just implemented, too soon to assess goals progression <30days   [] Goals require adjustment due to lack of progress  [] Patient is not progressing as expected and requires additional follow up with physician  [] Other    Prognosis for POC: [x] Good [] Fair  [] Poor      Patient requires continued skilled intervention: [x] Yes  [] No    Treatment/Activity Tolerance:  [x] Patient able to complete treatment  [] Patient limited by fatigue  [] Patient limited by pain     [] Patient limited by other medical complications  [] Other: Pt is a 54 y/o male presenting with diagnosis of OA lumbar spine from the MD.  Clinically, the pt presents with decreased ROM, decreased strength, decreased function, and increased pain consistent with the MD diagnosis.  The pt would benefit from skilled PT to return to PLOF.  Pt has multiple co-morbidities that are listed above. He has had multiple episodes of LBP. He has had different types of tx for this including chiropractic care (paid by cash, not through benefits). He was not TTP.

## 2020-05-11 ENCOUNTER — HOSPITAL ENCOUNTER (OUTPATIENT)
Dept: PHYSICAL THERAPY | Age: 55
Setting detail: THERAPIES SERIES
Discharge: HOME | End: 2020-05-11
Payer: COMMERCIAL

## 2020-05-11 PROCEDURE — 97140 MANUAL THERAPY 1/> REGIONS: CPT | Performed by: PHYSICAL THERAPIST

## 2020-05-11 PROCEDURE — 97112 NEUROMUSCULAR REEDUCATION: CPT | Performed by: PHYSICAL THERAPIST

## 2020-05-11 PROCEDURE — 97110 THERAPEUTIC EXERCISES: CPT | Performed by: PHYSICAL THERAPIST

## 2020-05-11 NOTE — FLOWSHEET NOTE
Orthopaedics and [address] [place] [address] DR MICHEAL XIE    Physical Therapy Treatment Note/ Progress Report:           Date:  2020    Patient Name:  Alyssia Fitzpatrick    :  1965  MRN: [id number]  Restrictions/Precautions:    Medical/Treatment Diagnosis Information:  · Diagnosis: M47.816 (ICD-10-CM) - Osteoarthritis of lumbar spine, unspecified spinal osteoarthritis complication status. Onset years with recent exacerbation in 2020  · Treatment Diagnosis: M54.5 - low back pain  Insurance/Certification information:  PT Insurance Information: UMR  Physician Information:  Referring Practitioner: Zena Cruz  Has the plan of care been signed (Y/N):        []  Yes  [x]  No     Date of Patient follow up with Physician: unknown      Is this a Progress Report:     []  Yes  [x]  No        If Yes:  Date Range for reporting period:  Beginning 2020  Ending    Progress report will be due (10 Rx or 30 days whichever is less): visit 10 or 2119       Recertification will be due (POC Duration  / 90 days whichever is less): visit 10 or 2020          Visit # Insurance Allowable Auth Required   2 30 []  Yes [x]  No        Functional Scale: CORINNE- 18   Date assessed: 2020    Latex Allergy:  [] NO      [x] YES sensitive  Preferred Language for Healthcare:   [x] English       [] other:      Pain level:  1/10     SUBJECTIVE:  He is a little sore. He was having some problems doing the traction at home as it is hard for him to hold himself up for a while. (Pt was instructed that he could just hold to what he feels comfortable with.)  He has more symptoms down his leg when he lays on his right side. He does better on his left side.       OBJECTIVE: See eval      Standing Exam Normal Abnormal N/A Comments   Toe walk         Heel Walk       Side bending       Pelvic Height       Fwd Bend- (aberrant juttering or innominate mvmt)       Extension       Trendelenburg       Kemps/Quadrant       Stork       SLS/SLS w Prone prop    Prone laying with pillow    Supine manual B LE distraction    Supine with wedge under LE              RESTRICTIONS/PRECAUTIONS: skin CA- malignant melanoma- gets checked every 6 months- removed. HTN-managed with medication. 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 Numbness and tingling on right lateral thigh (felt like getting stuck). H/o diarrhea and bowel/bladder problems- failed Cologuard test, but scope was negative. Pt reports he has had this for years (10 years or better). Sensitivity to latex. Hernia. Difficulty hearing high pitched noises. Sleep apnea- uses CPap machine. Tore meniscus on right knee. Exercises/Interventions:         Therapeutic Ex (59043) Resistance/Repetitions Notes/CUES   Piriformis stretch 3x:15 Ed on how to do it sitting   LTR 5x:10 each   Seated side flexion stretch 3x:30    SKTC 10x:10 Done bilaterally   DKTC 10x:10 With ball   Supine ITB stretch NV?    hooklying clams NV? Therapeutic Activity (10839)                                        NMR re-education (74071)  CUES NEEDED   Ab brace 10x:10 With biofeedback   Ed on self traction 2'    Glut sets hooklying 10x:10                   Manual Intervention (37201)     Traction 10'    MET pelvic shotgun and correction for long sit test 5'             Therapeutic Exercise and NMR EXR  [x] (88606) Provided verbal/tactile cueing for activities related to strengthening, flexibility, endurance, ROM for improvements in LE, proximal hip, and core control with self care, mobility, lifting, ambulation. [x] (63196) Provided verbal/tactile cueing for activities related to improving balance, coordination, kinesthetic sense, posture, motor skill, proprioception  to assist with LE, proximal hip, and core control in self care, mobility, lifting, ambulation and eccentric single leg control.      NMR and Therapeutic Activities:    [x] (41934 or 63398) Provided verbal/tactile cueing for activities related to improving balance, Independent in HEP and progression per patient tolerance, in order to prevent re-injury. []? Progressing: []? Met: []? Not Met: []? Adjusted   2. Patient will report pain at worst less than or equal to 6/10  []? Progressing: []? Met: []? Not Met: []? Adjusted     Long Term Goals: To be achieved in: 8 weeks  1. Disability index score of 10% or less for the CORINNE to assist with reaching prior level of function. []? Progressing: []? Met: []? Not Met: []? Adjusted  2. Patient will demonstrate increased AROM to full trunk rotation to allow for toilieting needs to allow for proper joint functioning as indicated by patients functional deficits. []? Progressing: []? Met: []? Not Met: []? Adjusted  3. Patient will demonstrate an increase in Strength to do ab brace independently to allow for proper functional mobility as indicated by patients Functional Deficits. []? Progressing: []? Met: []? Not Met: []? Adjusted  4. Patient will return to performing all bed mobility without increased symptoms or restriction. []? Progressing: []? Met: []? Not Met: []? Adjusted  5. Pt will report pain at worst less than or equal to 3/10. []? Progressing: []? Met: []? Not Met: []? Adjusted          Overall Progression Towards Functional goals/ Treatment Progress Update:  [] Patient is progressing as expected towards functional goals listed. [] Progression is slowed due to complexities/Impairments listed. [] Progression has been slowed due to co-morbidities.   [x] Plan just implemented, too soon to assess goals progression <30days   [] Goals require adjustment due to lack of progress  [] Patient is not progressing as expected and requires additional follow up with physician  [] Other    Prognosis for POC: [x] Good [] Fair  [] Poor      Patient requires continued skilled intervention: [x] Yes  [] No    Treatment/Activity Tolerance:  [x] Patient able to complete treatment  [] Patient limited by fatigue  [] Patient limited by

## 2020-05-18 ENCOUNTER — HOSPITAL ENCOUNTER (OUTPATIENT)
Dept: PHYSICAL THERAPY | Age: 55
Setting detail: THERAPIES SERIES
Discharge: HOME | End: 2020-05-18
Payer: COMMERCIAL

## 2020-05-18 PROCEDURE — 97140 MANUAL THERAPY 1/> REGIONS: CPT | Performed by: PHYSICAL THERAPIST

## 2020-05-18 PROCEDURE — 97110 THERAPEUTIC EXERCISES: CPT | Performed by: PHYSICAL THERAPIST

## 2020-05-18 PROCEDURE — 97112 NEUROMUSCULAR REEDUCATION: CPT | Performed by: PHYSICAL THERAPIST

## 2020-05-18 NOTE — FLOWSHEET NOTE
PATRICIA/Martin       Hip scour       SLR       Crossed SLR       Supine to sit       SI distraction/compression       Hip thrust                     Prone Exam Normal Abnormal N/A Comments   Prone knee bend       Prone hip IR       B Achilles reflex/Pheasant       PA/Spring       Prone Instability test       Sacral Spring/thrust                       ROM LEFT RIGHT Comments   Lumbar Flex      Lumbar Ext      Side Bend      Rotation                    ROM LEFT RIGHT Comments   Hip Flexion      Hip Abd      Hip ER      Hip IR      Hip Extension      Knee Ext      Knee Flex      Hamstring Flex      Piriformis                    Strength LEFT RIGHT Comments   Multifidus      Transverse Ab      Hip Flexors      Hip Abductors      Hip Extensors                     Myotomes Normal Abnormal Comments   Hip flexion (L1-L2)      Knee extension (L2-L4)      Dorsiflexion (L4-L5)      Great Toe Ext (L5)      Ankle Eversion (S1-S2)      Ankle PF(S1-S2)          Dermatomes Normal Abnormal Comments   inguinal area (L1)       anterior mid-thigh (L2)      distal ant thigh/med knee (L3)      medial lower leg and foot (L4)      lateral lower leg and foot (L5)      posterior calf (S1)      medial calcaneus (S2)          Neural dynamic tension testing Normal Abnormal Comments   Slump Test  - Degree of knee flexion:       SLR       0-30      30-70      Femoral nerve (L2-4)        Reflexes Normal Abnormal Comments   S1-2 Seated achilles      S1-2 Prone knee bend      L3-4 Patellar tendon      C5-6 Biceps      C6 Brachioradialis      C7-8 Triceps      Clonus      Babinski      Staley's            Positional Tolerance     Standing repeated flex    Standing repeated ext    Prone laying    Prone prop    Prone laying with pillow    Supine manual B LE distraction    Supine with wedge under LE              RESTRICTIONS/PRECAUTIONS: skin CA- malignant melanoma- gets checked every 6 months- removed. HTN-managed with medication.   H/o anxiety due to

## 2020-05-26 ENCOUNTER — HOSPITAL ENCOUNTER (OUTPATIENT)
Dept: PHYSICAL THERAPY | Age: 55
Setting detail: THERAPIES SERIES
Discharge: HOME | End: 2020-05-26
Payer: COMMERCIAL

## 2020-05-26 PROCEDURE — 97110 THERAPEUTIC EXERCISES: CPT | Performed by: PHYSICAL THERAPIST

## 2020-05-26 PROCEDURE — 97140 MANUAL THERAPY 1/> REGIONS: CPT | Performed by: PHYSICAL THERAPIST

## 2020-05-26 PROCEDURE — 97112 NEUROMUSCULAR REEDUCATION: CPT | Performed by: PHYSICAL THERAPIST

## 2020-05-26 NOTE — PLAN OF CARE
Orthopaedics and [address] [place] [address] DR MICHEAL XIE    Physical Therapy Treatment Note/ Progress Report:      Physical Therapy Re-Certification Plan of Care    Dear Dr. Oreilly Richard,    We had the pleasure of treating the following patient for physical therapy services at [address] [place] [address]. A summary of our findings can be found in the updated assessment below. This includes our plan of care. If you have any questions or concerns regarding these findings, please do not hesitate to contact me at the office phone number checked above. Thank you for the referral.     Physician Signature:________________________________Date:__________________  By signing above (or electronic signature), therapists plan is approved by physician    Date Range Of Visits: 2020-2020  Total Visits to Date: 4  Overall Response to Treatment:   [x] Patient is responding well to treatment and improvement is noted with regards to goals   [] Patient should continue to improve in reasonable time if they continue HEP   [] Patient has plateaued and is no longer responding to skilled PT intervention    [] Patient is getting worse and would benefit from return to referring MD   [] Patient unable to adhere to initial POC   [] Other: Pt simona tx fair/well. Pt has been sore over the last week due to demands at work and not sleeping as much. Thus, his tx was modified today. However, with reassessing motion and pain, it has been noted that his pain and function have improved. We discussed different ways to work on ab braces due to the difficulty he has with doing this. Pt would continue to benefit from skilled PT to improve strength, ROM, and function. Recommend continuing tx 1-2x/wk x 4-6 weeks.           Date:  2020    Patient Name:  Chaka Knutson    :  1965  MRN: [id number]  Restrictions/Precautions:    Medical/Treatment Diagnosis Information:  · Diagnosis: M47.816 (ICD-10-CM) - Osteoarthritis of lumbar report pain at worst less than or equal to 6/10  []? Progressing: [x]? Met: []? Not Met: []? Adjusted     Long Term Goals: To be achieved in: 8 weeks  1. Disability index score of 10% or less for the CORINNE to assist with reaching prior level of function. [x]? Progressing: []? Met: []? Not Met: []? Adjusted  2. Patient will demonstrate increased AROM to full trunk rotation to allow for toilieting needs to allow for proper joint functioning as indicated by patients functional deficits. [x]? Progressing: []? Met: []? Not Met: []? Adjusted  3. Patient will demonstrate an increase in Strength to do ab brace independently to allow for proper functional mobility as indicated by patients Functional Deficits. [x]? Progressing: []? Met: []? Not Met: []? Adjusted  4. Patient will return to performing all bed mobility without increased symptoms or restriction. []? Progressing: [x]? Met: []? Not Met: []? Adjusted  5. Pt will report pain at worst less than or equal to 3/10. []? Progressing: [x]? Met: []? Not Met: []? Adjusted          Overall Progression Towards Functional goals/ Treatment Progress Update:  [x] Patient is progressing as expected towards functional goals listed. [] Progression is slowed due to complexities/Impairments listed. [] Progression has been slowed due to co-morbidities. [] Plan just implemented, too soon to assess goals progression <30days   [] Goals require adjustment due to lack of progress  [] Patient is not progressing as expected and requires additional follow up with physician  [] Other    Prognosis for POC: [x] Good [] Fair  [] Poor      Patient requires continued skilled intervention: [x] Yes  [] No    Treatment/Activity Tolerance:  [x] Patient able to complete treatment  [] Patient limited by fatigue  [] Patient limited by pain     [] Patient limited by other medical complications  [] Other: Pt simona tx fair/well.   Pt has been sore over the last week due to demands at work and not sleeping as much. Thus, his tx was modified today. However, with reassessing motion and pain, it has been noted that his pain and function have improved. We discussed different ways to work on ab braces due to the difficulty he has with doing this. Pt would continue to benefit from skilled PT to improve strength, ROM, and function. Recommend continuing tx 1-2x/wk x 4-6 weeks. PLAN: If pt doesn't return, this note can be considered a D/C note. Continue ab bracing and manual tx. Continue tx 1-2x/wk x 4-6 weeks.    [x] Continue per plan of care [] Alter current plan (see comments above)  [] Plan of care initiated [] Hold pending MD visit [] Discharge  Electronically signed by: Cuauhtemoc Collazo DPT [id number]

## 2020-06-03 ENCOUNTER — HOSPITAL ENCOUNTER (OUTPATIENT)
Dept: PHYSICAL THERAPY | Age: 55
Setting detail: THERAPIES SERIES
Discharge: HOME | End: 2020-06-03
Payer: COMMERCIAL

## 2020-06-03 PROCEDURE — 97112 NEUROMUSCULAR REEDUCATION: CPT | Performed by: PHYSICAL THERAPIST

## 2020-06-03 PROCEDURE — 97140 MANUAL THERAPY 1/> REGIONS: CPT | Performed by: PHYSICAL THERAPIST

## 2020-06-03 PROCEDURE — 97110 THERAPEUTIC EXERCISES: CPT | Performed by: PHYSICAL THERAPIST

## 2020-06-03 NOTE — FLOWSHEET NOTE
G-I, II, III, IV (PA's, Inf., Post.)  [x] (91464) Provided manual therapy to mobilize LE, proximal hip and/or LS spine soft tissue/joints for the purpose of modulating pain, promoting relaxation,  increasing ROM, reducing/eliminating soft tissue swelling/inflammation/restriction, improving soft tissue extensibility and allowing for proper ROM for normal function with self care, mobility, lifting and ambulation. Modalities:      Charges:   Timed Code Treatment Minutes: 54'   Total Treatment Minutes: 61'     [] EVAL (LOW) 455 [id number]   [] EVAL (MOD) 96905   [] EVAL (HIGH) 16259   [] RE-EVAL   [x] BI(94627) x2     [] IONTO  [x] NMR (21627) x 1    [] VASO  [x] Manual (39848) x1      [] Other:  [] TA x      [] Mech Traction (65936)  [] ES(attended) (78741)      [] ES (un) (36120):       GOALS:   Patient stated goal: taking care of myself and exercising more    [x]? Progressing: []? Met: []? Not Met: []? Adjusted     Therapist goals for Patient:   Short Term Goals: To be achieved in: 2 weeks  1. Independent in HEP and progression per patient tolerance, in order to prevent re-injury. [x]? Progressing: []? Met: []? Not Met: []? Adjusted   2. Patient will report pain at worst less than or equal to 6/10  []? Progressing: [x]? Met: []? Not Met: []? Adjusted     Long Term Goals: To be achieved in: 8 weeks  1. Disability index score of 10% or less for the CORINNE to assist with reaching prior level of function. [x]? Progressing: []? Met: []? Not Met: []? Adjusted  2. Patient will demonstrate increased AROM to full trunk rotation to allow for toilieting needs to allow for proper joint functioning as indicated by patients functional deficits. [x]? Progressing: []? Met: []? Not Met: []? Adjusted  3. Patient will demonstrate an increase in Strength to do ab brace independently to allow for proper functional mobility as indicated by patients Functional Deficits. [x]? Progressing: []? Met: []? Not Met: []? Adjusted  4.  Patient

## 2020-06-08 ENCOUNTER — HOSPITAL ENCOUNTER (OUTPATIENT)
Dept: PHYSICAL THERAPY | Age: 55
Setting detail: THERAPIES SERIES
Discharge: HOME | End: 2020-06-08
Payer: COMMERCIAL

## 2020-06-08 PROCEDURE — 97110 THERAPEUTIC EXERCISES: CPT | Performed by: PHYSICAL THERAPIST

## 2020-06-08 PROCEDURE — 97140 MANUAL THERAPY 1/> REGIONS: CPT | Performed by: PHYSICAL THERAPIST

## 2020-06-08 PROCEDURE — 97112 NEUROMUSCULAR REEDUCATION: CPT | Performed by: PHYSICAL THERAPIST

## 2020-06-08 NOTE — FLOWSHEET NOTE
flexion       B hip IR                     Supine Exam Normal Abnormal N/A Comments   Hip flexion       Abduction       ER       IR       APTRICIA/Martin       Hip scour       SLR       Crossed SLR       Supine to sit       SI distraction/compression       Hip thrust                     Prone Exam Normal Abnormal N/A Comments   Prone knee bend       Prone hip IR       B Achilles reflex/Pheasant       PA/Spring       Prone Instability test       Sacral Spring/thrust                       ROM LEFT RIGHT Comments   Lumbar Flex      Lumbar Ext      Side Bend      Rotation                    ROM LEFT RIGHT Comments   Hip Flexion      Hip Abd      Hip ER      Hip IR      Hip Extension      Knee Ext      Knee Flex      Hamstring Flex      Piriformis                    Strength LEFT RIGHT Comments   Multifidus      Transverse Ab      Hip Flexors      Hip Abductors      Hip Extensors                     Myotomes Normal Abnormal Comments   Hip flexion (L1-L2)      Knee extension (L2-L4)      Dorsiflexion (L4-L5)      Great Toe Ext (L5)      Ankle Eversion (S1-S2)      Ankle PF(S1-S2)          Dermatomes Normal Abnormal Comments   inguinal area (L1)       anterior mid-thigh (L2)      distal ant thigh/med knee (L3)      medial lower leg and foot (L4)      lateral lower leg and foot (L5)      posterior calf (S1)      medial calcaneus (S2)          Neural dynamic tension testing Normal Abnormal Comments   Slump Test  - Degree of knee flexion:       SLR       0-30      30-70      Femoral nerve (L2-4)        Reflexes Normal Abnormal Comments   S1-2 Seated achilles      S1-2 Prone knee bend      L3-4 Patellar tendon      C5-6 Biceps      C6 Brachioradialis      C7-8 Triceps      Clonus      Babinski      Staley's            Positional Tolerance     Standing repeated flex    Standing repeated ext    Prone laying    Prone prop    Prone laying with pillow    Supine manual B LE distraction    Supine with wedge under LE with Strap - 3 sets - 30 hold - 1-2x daily - 7x weekly  Clamshell - 10 reps - 3 sets - 1-2x daily - 7x weekly    Therapeutic Exercise and NMR EXR  [x] (41246) Provided verbal/tactile cueing for activities related to strengthening, flexibility, endurance, ROM for improvements in LE, proximal hip, and core control with self care, mobility, lifting, ambulation. [x] (77840) Provided verbal/tactile cueing for activities related to improving balance, coordination, kinesthetic sense, posture, motor skill, proprioception  to assist with LE, proximal hip, and core control in self care, mobility, lifting, ambulation and eccentric single leg control.      NMR and Therapeutic Activities:    [x] (12963 or 29675) Provided verbal/tactile cueing for activities related to improving balance, coordination, kinesthetic sense, posture, motor skill, proprioception and motor activation to allow for proper function of core, proximal hip and LE with self care and ADLs  [x] (14349) Gait Re-education- Provided training and instruction to the patient for proper LE, core and proximal hip recruitment and positioning and eccentric body weight control with ambulation re-education including up and down stairs     Home Exercise Program:    [x] (75729) Reviewed/Progressed HEP activities related to strengthening, flexibility, endurance, ROM of core, proximal hip and LE for functional self-care, mobility, lifting and ambulation/stair navigation   [] (79672)Reviewed/Progressed HEP activities related to improving balance, coordination, kinesthetic sense, posture, motor skill, proprioception of core, proximal hip and LE for self care, mobility, lifting, and ambulation/stair navigation      Manual Treatments:  PROM / STM / Oscillations-Mobs:  G-I, II, III, IV (PA's, Inf., Post.)  [x] (27837) Provided manual therapy to mobilize LE, proximal hip and/or LS spine soft tissue/joints for the purpose of modulating pain, promoting relaxation,  increasing ROM, reducing/eliminating soft tissue swelling/inflammation/restriction, improving soft tissue extensibility and allowing for proper ROM for normal function with self care, mobility, lifting and ambulation. Modalities:      Charges:   Timed Code Treatment Minutes: 39'   Total Treatment Minutes: 39'     [] EVAL (LOW) [phone]   [] EVAL (MOD) 76677   [] EVAL (HIGH) 32190   [] RE-EVAL   [x] ZJ(94243) x1     [] IONTO  [x] NMR (09239) x 1    [] VASO  [x] Manual (41854) x1      [] Other:  [] TA x      [] Mech Traction (94430)  [] ES(attended) (81156)      [] ES (un) (47308):       GOALS:   Patient stated goal: taking care of myself and exercising more    [x]? Progressing: []? Met: []? Not Met: []? Adjusted     Therapist goals for Patient:   Short Term Goals: To be achieved in: 2 weeks  1. Independent in HEP and progression per patient tolerance, in order to prevent re-injury. [x]? Progressing: []? Met: []? Not Met: []? Adjusted   2. Patient will report pain at worst less than or equal to 6/10  []? Progressing: [x]? Met: []? Not Met: []? Adjusted     Long Term Goals: To be achieved in: 8 weeks  1. Disability index score of 10% or less for the CORINNE to assist with reaching prior level of function. [x]? Progressing: []? Met: []? Not Met: []? Adjusted  2. Patient will demonstrate increased AROM to full trunk rotation to allow for toilieting needs to allow for proper joint functioning as indicated by patients functional deficits. [x]? Progressing: []? Met: []? Not Met: []? Adjusted  3. Patient will demonstrate an increase in Strength to do ab brace independently to allow for proper functional mobility as indicated by patients Functional Deficits. [x]? Progressing: []? Met: []? Not Met: []? Adjusted  4. Patient will return to performing all bed mobility without increased symptoms or restriction. []? Progressing: [x]? Met: []? Not Met: []? Adjusted  5. Pt will report pain at worst less than or equal to 3/10.    []? Progressing: [x]? Met: []? Not Met: []? Adjusted          Overall Progression Towards Functional goals/ Treatment Progress Update:  [x] Patient is progressing as expected towards functional goals listed. [] Progression is slowed due to complexities/Impairments listed. [] Progression has been slowed due to co-morbidities. [] Plan just implemented, too soon to assess goals progression <30days   [] Goals require adjustment due to lack of progress  [] Patient is not progressing as expected and requires additional follow up with physician  [] Other    Prognosis for POC: [x] Good [] Fair  [] Poor      Patient requires continued skilled intervention: [x] Yes  [] No    Treatment/Activity Tolerance:  [x] Patient able to complete treatment  [] Patient limited by fatigue  [] Patient limited by pain     [] Patient limited by other medical complications  [] Other: Pt simona tx fair/well. He did have increased radiating symptoms with txn on the right leg. Thus it was stopped. We also held the cross body ITB due to the soreness it caused. He can do the figure 4 stretch instead. He did have relieved symptoms with the SB knee to chest exercise. I've asked him to do more flexion based exercises if he has radiating symptoms. Pt was able to do ab brace in modified plantigrade with tactile cuing. He will continue to work on this at home. Pt would continue to benefit from skilled PT to improve strength, ROM, and function. Recommend continuing tx 1-2x/wk x 4-6 weeks. PLAN: If pt doesn't return, this note can be considered a D/C note. Continue ab bracing and manual tx. Continue tx 1-2x/wk x 4-6 weeks.     [x] Continue per plan of care [] Alter current plan (see comments above)  [] Plan of care initiated [] Hold pending MD visit [] Discharge  Electronically signed by: Dannie Cartagena DPT [id number]

## 2020-06-15 ENCOUNTER — HOSPITAL ENCOUNTER (OUTPATIENT)
Dept: PHYSICAL THERAPY | Age: 55
Setting detail: THERAPIES SERIES
Discharge: HOME | End: 2020-06-15
Payer: COMMERCIAL

## 2020-06-22 ENCOUNTER — HOSPITAL ENCOUNTER (OUTPATIENT)
Dept: PHYSICAL THERAPY | Age: 55
Setting detail: THERAPIES SERIES
Discharge: HOME | End: 2020-06-22
Payer: COMMERCIAL

## 2020-06-22 PROCEDURE — 97112 NEUROMUSCULAR REEDUCATION: CPT | Performed by: PHYSICAL THERAPIST

## 2020-06-22 PROCEDURE — 97110 THERAPEUTIC EXERCISES: CPT | Performed by: PHYSICAL THERAPIST

## 2020-06-22 NOTE — FLOWSHEET NOTE
Orthopaedics and [address] [place] [address] DR MICHEAL XIE    Physical Therapy Treatment Note/ Progress Report:        Physical Therapy Re-Certification Plan of Care    Dear Dr. Sergio Marie,    We had the pleasure of treating the following patient for physical therapy services at [address] [place] [address]. A summary of our findings can be found in the updated assessment below. This includes our plan of care. If you have any questions or concerns regarding these findings, please do not hesitate to contact me at the office phone number checked above. Thank you for the referral.     Physician Signature:________________________________Date:__________________  By signing above (or electronic signature), therapists plan is approved by physician    Date Range Of Visits: 2020-2020  Total Visits to Date: 7  Overall Response to Treatment:   [x] Patient is responding well to treatment and improvement is noted with regards to goals   [] Patient should continue to improve in reasonable time if they continue HEP   [] Patient has plateaued and is no longer responding to skilled PT intervention    [] Patient is getting worse and would benefit from return to referring MD   [] Patient unable to adhere to initial POC   [] Other: Pt simona tx fair/well. He has demonstrated overall improved pain and function. His subjective reports of pain has been a bit inconsistent in the past 4 weeks though. I have recommended that the pt touch base with his PCP. He is understanding. He has not had radiculopathy in tx today. He does continue to have LBP and has to be inconsistent with his HEP depending on his symptoms t/o the day. Pt would continue to benefit from skilled PT to improve strength, ROM, and function. Recommend continuing tx 1-2x/wk x 4-6 weeks.                           Date:  2020    Patient Name:  Hattie Gates    :  1965  MRN: [id number]  Restrictions/Precautions:    Medical/Treatment Diagnosis Tolerance     Standing repeated flex    Standing repeated ext    Prone laying    Prone prop    Prone laying with pillow    Supine manual B LE distraction    Supine with wedge under LE              RESTRICTIONS/PRECAUTIONS: skin CA- malignant melanoma- gets checked every 6 months- removed. HTN-managed with medication. 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 Numbness and tingling on right lateral thigh (felt like getting stuck). H/o diarrhea and bowel/bladder problems- failed Cologuard test, but scope was negative. Pt reports he has had this for years (10 years or better). Sensitivity to latex. Hernia. Difficulty hearing high pitched noises. Sleep apnea- uses CPap machine. Tore meniscus on right knee. Exercises/Interventions:         Therapeutic Ex (00855) Resistance/Repetitions Notes/CUES   Piriformis stretch 5x:20 Figure 4   LTR 5x:10 each   Seated side flexion stretch 3x:30 bilaterally   SKTC DKTC 10x:10 Seated rounded forward   DKTC with SB heel slides 10x:10    Supine ITB stretch  Supine on right only today   sidelying clams 3x10  bilaterally   sidelying hip ABD 2x10  bilaterally             Therapeutic Activity (89458)                                        NMR re-education (82341)  CUES NEEDED   Ab brace 10x:10 With biofeedback   Ed on self traction     Glut sets to bridge 10x                   Manual Intervention (09426)     Traction MET pelvic shotgun and correction for long sit test              Access Code: [email or web address]   URL: [email or web address]   Date: 06/03/2020   Prepared by: Raji Mariee     Exercises  Supine Lower Trunk Rotation - 10 reps - 10 hold - 1-2x daily - 7x weekly  Hooklying Gluteal Sets - 10 reps - 10 hold - 1-2x daily - 7x weekly  Seated Lumbar Flexion Stretch - 10 reps - 10 hold - 1-2x daily - 7x weekly  Prone Transversus Abdominus Contraction - 10 reps - 3 sets - 1x daily - 7x weekly  Seated Quadratus Lumborum Stretch with Arm Overhead - 3 sets                   - 30 hold

## 2020-06-29 ENCOUNTER — APPOINTMENT (OUTPATIENT)
Dept: PHYSICAL THERAPY | Age: 55
End: 2020-06-29
Payer: COMMERCIAL

## 2020-08-28 ENCOUNTER — TELEPHONE (OUTPATIENT)
Dept: FAMILY MEDICINE CLINIC | Age: 55
End: 2020-08-28

## 2020-08-28 NOTE — TELEPHONE ENCOUNTER
PT came into the office this afternoon with paperwork for Dr. Elsi Qureshi to review. Best call back number: [phone]    Placed in Ani's basket.

## 2020-10-26 ENCOUNTER — OFFICE VISIT (OUTPATIENT)
Dept: FAMILY MEDICINE CLINIC | Age: 55
End: 2020-10-26
Payer: COMMERCIAL

## 2020-10-26 VITALS
SYSTOLIC BLOOD PRESSURE: 129 MMHG | HEIGHT: 70 IN | RESPIRATION RATE: 16 BRPM | OXYGEN SATURATION: 97 % | HEART RATE: 68 BPM | DIASTOLIC BLOOD PRESSURE: 80 MMHG | WEIGHT: 314 LBS | BODY MASS INDEX: 44.95 KG/M2

## 2020-10-26 PROBLEM — M19.90 OSTEOARTHRITIS: Status: ACTIVE | Noted: 2020-10-26

## 2020-10-26 LAB
A/G RATIO: 1.7 (ref 1.1–2.2)
ALBUMIN SERPL-MCNC: 4.3 G/DL (ref 3.4–5)
ALP BLD-CCNC: 73 U/L (ref 40–129)
ALT SERPL-CCNC: 48 U/L (ref 10–40)
ANION GAP SERPL CALCULATED.3IONS-SCNC: 11 MMOL/L (ref 3–16)
AST SERPL-CCNC: 46 U/L (ref 15–37)
BILIRUB SERPL-MCNC: 0.4 MG/DL (ref 0–1)
BUN BLDV-MCNC: 12 MG/DL (ref 7–20)
CALCIUM SERPL-MCNC: 9.6 MG/DL (ref 8.3–10.6)
CHLORIDE BLD-SCNC: 101 MMOL/L (ref 99–110)
CO2: 25 MMOL/L (ref 21–32)
CREAT SERPL-MCNC: 1 MG/DL (ref 0.9–1.3)
GFR AFRICAN AMERICAN: >60
GFR NON-AFRICAN AMERICAN: >60
GLOBULIN: 2.6 G/DL
GLUCOSE BLD-MCNC: 111 MG/DL (ref 70–99)
POTASSIUM SERPL-SCNC: 4.1 MMOL/L (ref 3.5–5.1)
SODIUM BLD-SCNC: 137 MMOL/L (ref 136–145)
TOTAL PROTEIN: 6.9 G/DL (ref 6.4–8.2)

## 2020-10-26 PROCEDURE — 36415 COLL VENOUS BLD VENIPUNCTURE: CPT | Performed by: INTERNAL MEDICINE

## 2020-10-26 PROCEDURE — 99214 OFFICE O/P EST MOD 30 MIN: CPT | Performed by: INTERNAL MEDICINE

## 2020-10-26 RX ORDER — AMOXICILLIN 500 MG/1
CAPSULE ORAL
COMMUNITY
Start: 2020-10-22 | End: 2021-04-26

## 2020-10-26 NOTE — PATIENT INSTRUCTIONS
Call for CT scan when you are ready. Colonoscopy 2022 for follow-up of your adenomatous polyp. Decrease calories. If you are interested in the bariatric clinic for weight loss surgery in prevention please let me know. Patient Education        Learning About Cutting Calories  How do calories affect your weight? Food gives your body energy. Energy from the food you eat is measured in calories. This energy keeps your heart beating, your brain active, and your muscles working. Your body needs a certain number of calories each day. After your body uses the calories it needs, it stores extra calories as fat. To lose weight safely, you have to eat fewer calories while eating in a healthy way. How many calories do you need each day? The more active you are, the more calories you need. When you are less active, you need fewer calories. How many calories you need each day also depends on several things, including your age and whether you are male or female. Here are some general guidelines for adults:  · Less active women and older adults need 1,600 to 2,000 calories each day. · Active women and less active men need 2,000 to 2,400 calories each day. · Active men need 2,400 to 3,000 calories each day. How can you cut calories and eat healthy meals? Whole grains, vegetables and fruits, and dried beans are good lower-calorie foods. They give you lots of nutrients and fiber. And they fill you up. Sweets, energy drinks, and soda pop are high in calories. They give you few nutrients and no fiber. Try to limit soda pop, fruit juice, and energy drinks. Drink water instead. Some fats can be part of a healthy diet. But cutting back on fats from highly processed foods like fast foods and many snack foods is a good way to lower the calories in your diet. Also, use smaller amounts of fats like butter, margarine, salad dressing, and mayonnaise.  Add fresh garlic, lemon, or herbs to your meals to add flavor without adding fat.  Meats and dairy products can be a big source of hidden fats. Try to choose lean or low-fat versions of these products. Fat-free cookies, candies, chips, and frozen treats can still be high in sugar and calories. Some fat-free foods have more calories than regular ones. Eat fat-free treats in moderation, as you would other foods. If your favorite foods are high in fat, salt, sugar, or calories, limit how often you eat them. Eat smaller servings, or look for healthy substitutes. Fill up on fruits, vegetables, and whole grains. Eating at home  · Use meat as a side dish instead of as the main part of your meal.  · Try main dishes that use whole wheat pasta, brown rice, dried beans, or vegetables. · Find ways to cook with little or no fat, such as broiling, steaming, or grilling. · Use cooking spray instead of oil. If you use oil, use a monounsaturated oil, such as canola or olive oil. · Trim fat from meats before you cook them. · Drain off fat after you brown the meat or while you roast it. · Chill soups and stews after you cook them. Then skim the fat off the top after it hardens. Eating out  · Order foods that are broiled or poached rather than fried or breaded. · Cut back on the amount of butter or margarine that you use on bread. · Order sauces, gravies, and salad dressings on the side, and use only a little. · When you order pasta, choose tomato sauce rather than cream sauce. · Ask for salsa with your baked potato instead of sour cream, butter, cheese, or delacruz. · Order meals in a small size instead of upgrading to a large. · Share an entree, or take part of your food home to eat as another meal.  · Share appetizers and desserts. Where can you learn more? Go to https://arden.[employer]. org and sign in to your [employer] account. Enter X745 in the [employer] box to learn more about \"Learning About Cutting Calories. \"     If you do not have an account, please click on the \"Sign Up Now\" link. Current as of: August 22, 2019               Content Version: 12.6  © 7996-0940 [employer]. Care instructions adapted under license by [facility] ([facility]). If you have questions about a medical condition or this instruction, always ask your healthcare professional. [address] any warranty or liability for your use of this information. Patient Education        Starting a Weight Loss Plan: Care Instructions  Your Care Instructions     If you are thinking about losing weight, it can be hard to know where to start. Your doctor can help you set up a weight loss plan that best meets your needs. You may want to take a class on nutrition or exercise, or join a weight loss support group. If you have questions about how to make changes to your eating or exercise habits, ask your doctor about seeing a registered dietitian or an exercise specialist.  It can be a big challenge to lose weight. But you do not have to make huge changes at once. Make small changes, and stick with them. When those changes become habit, add a few more changes. If you do not think you are ready to make changes right now, try to pick a date in the future. Make an appointment to see your doctor to discuss whether the time is right for you to start a plan. Follow-up care is a key part of your treatment and safety. Be sure to make and go to all appointments, and call your doctor if you are having problems. It's also a good idea to know your test results and keep a list of the medicines you take. How can you care for yourself at home? · Set realistic goals. Many people expect to lose much more weight than is likely. A weight loss of 5% to 10% of your body weight may be enough to improve your health. · Get family and friends involved to provide support. Talk to them about why you are trying to lose weight, and ask them to help.  They can help by participating in exercise and having meals with

## 2020-10-26 NOTE — PROGRESS NOTES
HPI: Criselda Long presents for evaluation and management of hypertension    Chronic health issues include osteoarthritis, negative node melanoma, hypertension, obesity, sleep apnea, prediabetes, adenomatous polyps, chronic thoracic pain. DJD spine     2005 hypertension. Compliant with medication no chest pain palpitations lower extremity edema or new exercise intolerance.  Resting bradycardia without near syncope or palpitations.  Mild hyperlipidemia. Checks blood pressures 1-2 times a month. States they are all less than 140/90. No exercise outside of work.     Abscessed tooth. Amoxicillin. Following with dentist.  Recurrent issue. Chronic low back pain. MRI thoracic lumbar spine with osteoarthritis. Chiropractor is no longer in practice. No longer using nonsteroidals secondary to dyspepsia. Celebrex was effective in the past.  Feels like he is used up his fixed sickle therapy. No change in his baseline issues. Right thoracic chest discomfort CT chest with stable pulmonary nodules. History of melanoma. No metastatic disease. Recent Derm follow-up with benign lesions    BMI 45. Sleep apnea. Occasional knee pain. Dyspepsia. Transient elevation A1c. No polyuria. Not following diet. Not interested in surgical intervention        Stable umbilical hernia without recurrent abdominal discomfort.  Weight is down.  No constipation diarrhea no abdominal pain.  PRN Pepcid  rarely. Adenomatous polyps repeat due 12.2022.          1181 negative node melanoma excision. Interferon ×4 doses and discontinued early secondary to mental status issues. . Dr Chiu Neighbor.  No recurrence.  Teen follow-up.      Pulmonary  nodule stable.     Adenomatous polyp.  Recheck colonoscopy 12/2022         PMH:    sleep study     melanoma resection     Adenomatous polyp    MEDS:  lisinopril hydrochlorothiazide, vitamin D 4000, zinc     Allergy percocet itch     SH: step son and wife's sister ( handicap).  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 No alcohol. No std concern. n. wife's sister with mental disorder secondary to childhood injury. Wife history colon cancer. FH: +skin cancer,MGM liver cancer,maternal uncle ? Colon cancer,  lung cancer, DM          No prostate cancer suicides or addictions.     Review of systems: Positive snoring and apnea. Decreased hearing on screening.  Up-to-date eye exam and dentist.  Rhinitis. Rare GE reflux. Denies any wheezing pneumonias or abnormal chest x-rays. Rare constipation. Hemorrhoids. . No kidney stones or known prostatism. No concerns with STD. Positive melanoma. No suspicious skin lesions today.  Rare headaches. Probable allergies. DJD spine and coccygeal pain. Knee pain improved.  Adjustment disorder with depressed mood and arthralgias        Constitutional, ent, CV, respiratory, GI, , joint, skin, allergic and psychiatric ROS reviewed and negative except for above    Allergies   Allergen Reactions    Latex Rash     Redness-sensitive    Oxycodone-Acetaminophen Rash       Outpatient Medications Marked as Taking for the 10/26/20 encounter (Office Visit) with Anel Ulloa MD   Medication Sig Dispense Refill    amoxicillin (AMOXIL) 500 MG capsule       lisinopril-hydroCHLOROthiazide (PRINZIDE;ZESTORETIC) 20-12.5 MG per tablet TAKE 1 TABLET BY MOUTH EVERY NIGHT AT BEDTIME 90 tablet 0    Cholecalciferol (VITAMIN D3) 5000 UNITS TABS Take 1 tablet by mouth daily                Past Medical History:   Diagnosis Date    Acid reflux     Anesthesia complication     was told that was combative when coming out of [facility]    Cancer [facility])     malignant melanoma-back-stage 3    Chronic right-sided thoracic back pain 5/22/2019    Hx of colonic polyps 10/28/2019    10.2019    Hx of melanoma excision 9/27/2017    Hypertension     Positive FIT (fecal immunochemical test) 10/28/2019    10.2019    Prediabetes 5/22/2019    5.8    Pulmonary nodules 10/28/2019    10.2019 2 small groundglass nodules within the central aspect of the right upper lobe which measures 8 mm a few additional 2 mm noncalcified nodules throughout the lung 2010 pulm nodules     Sleep apnea     uses c-pap    Wears glasses        Past Surgical History:   Procedure Laterality Date    COLONOSCOPY  12/2019    Dr. Ze Yu    COLONOSCOPY N/A 12/9/2019    COLONOSCOPY POLYPECTOMY SNARE/COLD BIOPSY performed by Craig Chappell MD at [id number] [facility]      wide excision to remove malignant melanoma from back and removed lymph nodes    WISDOM TOOTH EXTRACTION               Family History   Problem Relation Age of Onset    COPD Mother     Lung Cancer Father     Liver Cancer Maternal Grandmother     Stroke Paternal Grandmother     Diabetes Paternal Grandmother          Objective     /80   Pulse 68   Resp 16   Ht 5' 10\" (1.778 m)   Wt (!) 314 lb (142.4 kg)   SpO2 97% Comment: RA  BMI 45.05 kg/m²     @LASTSAO2(3)@    Wt Readings from Last 3 Encounters:   12/09/19 (!) 313 lb 7.9 oz (142.2 kg)   10/28/19 (!) 307 lb (139.3 kg)   05/22/19 (!) 313 lb (142 kg)       Physical Exam     NAD alert and cooperative  HEENT: Serous otitis. Pink conjunctive a. No anterior cervical adenopathy. Good upstroke of the carotids. Lungs diminished breath sounds however no wheezes rales or rhonchi. Dull at the base. Cardiovascular exam regular rate and rhythm without any murmur click. Abdomen is obese. I do not detect any fluid or hepatosplenomegaly. Nonincarcerated umbilical hernia. Good pulses feet. Sensation is intact. No bony abnormality. Multiple hyperpigmented macules. Few skin tags. Well-healed surgical scar back. Mood is appropriate.   Normal affect      Chemistry        Component Value Date/Time     03/11/2020 0934    K 4.3 03/11/2020 0934     03/11/2020 0934    CO2 23 03/11/2020 0934    BUN 18 03/11/2020 0934    CREATININE 0.8 (L) 03/11/2020 3746 Component Value Date/Time    CALCIUM 9.7 03/11/2020 0934    ALKPHOS 64 04/25/2019 1125    AST 30 04/25/2019 1125    ALT 37 04/25/2019 1125    BILITOT 0.5 04/25/2019 1125            Lab Results   Component Value Date    WBC 5.7 10/28/2019    HGB 13.9 10/28/2019    HCT 39.3 (L) 10/28/2019    MCV 89.4 10/28/2019     10/28/2019     Lab Results   Component Value Date    LABA1C 5.4 03/11/2020     Lab Results   Component Value Date    .3 03/11/2020     Lab Results   Component Value Date    LABA1C 5.4 03/11/2020     No components found for: CHLPL  Lab Results   Component Value Date    TRIG 105 09/28/2017     Lab Results   Component Value Date    HDL 40 09/28/2017     Lab Results   Component Value Date    LDLCALC 65 09/28/2017     Lab Results   Component Value Date    LABVLDL 21 09/28/2017       Old labs and records reviewed or requested  Discussed past lab and studies with patient      Diagnosis Orders   1. Essential hypertension  Comprehensive Metabolic Panel   2. Sleep apnea, unspecified type     3. Prediabetes     4. Hx of melanoma excision     5. Hx of colonic polyps     6. Primary osteoarthritis involving multiple joints     7. BMI 45.0-49.9, adult ([address]     8. Pulmonary nodule  CT CHEST WO CONTRAST     Hypertension good control continue current medications. Advised on exercise weight loss. CPAP compliant. History melanoma routine follow-ups    Adenomatous polyp recheck in 2022. Osteoarthritis. Consider Celebrex again. Stretching. Weight loss. Elevated BMI. Information on weight loss given. History of pulmonary nodule. Will recheck CT. Never smoked. Does have history of melanoma however      No follow-ups on file. Diagnosis and treatment discussed.   Possible side effects of medication reviewed  Patients questions answered  Follow up understood  Pt aware if they are not contacted about any test results , this does not mean they are normal.  They should call

## 2020-11-12 ENCOUNTER — PATIENT MESSAGE (OUTPATIENT)
Dept: FAMILY MEDICINE CLINIC | Age: 55
End: 2020-11-12

## 2020-11-12 RX ORDER — LISINOPRIL AND HYDROCHLOROTHIAZIDE 20; 12.5 MG/1; MG/1
TABLET ORAL
Qty: 90 TABLET | Refills: 1 | Status: SHIPPED | OUTPATIENT
Start: 2020-11-12 | End: 2021-04-26 | Stop reason: SDUPTHER

## 2020-11-12 RX ORDER — CELECOXIB 200 MG/1
CAPSULE ORAL
Qty: 30 CAPSULE | Refills: 0 | Status: SHIPPED | OUTPATIENT
Start: 2020-11-12 | End: 2021-04-26

## 2020-11-12 NOTE — TELEPHONE ENCOUNTER
From: Glory Harmon  To: Adonay Chadwick MD  Sent: 11/12/2020 10:04 AM EST  Subject: Prescription Question    Hi,  I need the Lisinopril-Hctz 20/12.5Mg Tablets refilled. I'm out and [facility] has been showing for days that they are waiting for doctor info. Also could I get a refill for the   Meloxicam 15Mg Tablets   OR   Celecoxib 200Mg Capsules? They both seemed to help with my back issues in the past. Either medication is fine I just don't know which one is best. I meant to ask about it at my appointment but forgot.    Thanks  Maciel Harmon

## 2021-03-23 ENCOUNTER — IMMUNIZATION (OUTPATIENT)
Dept: PRIMARY CARE CLINIC | Age: 56
End: 2021-03-23
Payer: COMMERCIAL

## 2021-03-23 PROCEDURE — 91301 COVID-19, MODERNA VACCINE 100MCG/0.5ML DOSE: CPT | Performed by: FAMILY MEDICINE

## 2021-03-23 PROCEDURE — 0011A PR IMM ADMN SARSCOV2 100 MCG/0.5 ML 1ST DOSE: CPT | Performed by: FAMILY MEDICINE

## 2021-04-20 ENCOUNTER — IMMUNIZATION (OUTPATIENT)
Dept: PRIMARY CARE CLINIC | Age: 56
End: 2021-04-20
Payer: COMMERCIAL

## 2021-04-20 PROCEDURE — 91301 COVID-19, MODERNA VACCINE 100MCG/0.5ML DOSE: CPT | Performed by: FAMILY MEDICINE

## 2021-04-20 PROCEDURE — 0012A COVID-19, MODERNA VACCINE 100MCG/0.5ML DOSE: CPT | Performed by: FAMILY MEDICINE

## 2021-04-26 ENCOUNTER — OFFICE VISIT (OUTPATIENT)
Dept: FAMILY MEDICINE CLINIC | Age: 56
End: 2021-04-26
Payer: COMMERCIAL

## 2021-04-26 VITALS
BODY MASS INDEX: 44.52 KG/M2 | DIASTOLIC BLOOD PRESSURE: 78 MMHG | RESPIRATION RATE: 16 BRPM | WEIGHT: 311 LBS | OXYGEN SATURATION: 98 % | SYSTOLIC BLOOD PRESSURE: 112 MMHG | HEIGHT: 70 IN | HEART RATE: 56 BPM

## 2021-04-26 DIAGNOSIS — Z98.890 HX OF MELANOMA EXCISION: ICD-10-CM

## 2021-04-26 DIAGNOSIS — M47.9 DEGENERATIVE JOINT DISEASE OF LOW BACK: ICD-10-CM

## 2021-04-26 DIAGNOSIS — R73.9 ELEVATED BLOOD SUGAR: ICD-10-CM

## 2021-04-26 DIAGNOSIS — G47.30 SLEEP APNEA, UNSPECIFIED TYPE: ICD-10-CM

## 2021-04-26 DIAGNOSIS — M15.9 PRIMARY OSTEOARTHRITIS INVOLVING MULTIPLE JOINTS: ICD-10-CM

## 2021-04-26 DIAGNOSIS — Z85.820 HX OF MELANOMA EXCISION: ICD-10-CM

## 2021-04-26 DIAGNOSIS — I10 ESSENTIAL HYPERTENSION: Primary | ICD-10-CM

## 2021-04-26 DIAGNOSIS — Z86.010 HX OF COLONIC POLYPS: ICD-10-CM

## 2021-04-26 DIAGNOSIS — R91.8 PULMONARY NODULES: ICD-10-CM

## 2021-04-26 DIAGNOSIS — E78.2 MIXED HYPERLIPIDEMIA: ICD-10-CM

## 2021-04-26 LAB
A/G RATIO: 1.6 (ref 1.1–2.2)
ALBUMIN SERPL-MCNC: 4.4 G/DL (ref 3.4–5)
ALP BLD-CCNC: 58 U/L (ref 40–129)
ALT SERPL-CCNC: 29 U/L (ref 10–40)
ANION GAP SERPL CALCULATED.3IONS-SCNC: 13 MMOL/L (ref 3–16)
AST SERPL-CCNC: 28 U/L (ref 15–37)
BILIRUB SERPL-MCNC: 0.6 MG/DL (ref 0–1)
BUN BLDV-MCNC: 17 MG/DL (ref 7–20)
CALCIUM SERPL-MCNC: 9.3 MG/DL (ref 8.3–10.6)
CHLORIDE BLD-SCNC: 103 MMOL/L (ref 99–110)
CHOLESTEROL, FASTING: 115 MG/DL (ref 0–199)
CO2: 25 MMOL/L (ref 21–32)
CREAT SERPL-MCNC: 1 MG/DL (ref 0.9–1.3)
GFR AFRICAN AMERICAN: >60
GFR NON-AFRICAN AMERICAN: >60
GLOBULIN: 2.7 G/DL
GLUCOSE BLD-MCNC: 94 MG/DL (ref 70–99)
HCT VFR BLD CALC: 37.9 % (ref 40.5–52.5)
HDLC SERPL-MCNC: 37 MG/DL (ref 40–60)
HEMOGLOBIN: 13.3 G/DL (ref 13.5–17.5)
LACTATE DEHYDROGENASE: 157 U/L (ref 100–190)
LDL CHOLESTEROL CALCULATED: 56 MG/DL
MCH RBC QN AUTO: 31.2 PG (ref 26–34)
MCHC RBC AUTO-ENTMCNC: 35.1 G/DL (ref 31–36)
MCV RBC AUTO: 88.9 FL (ref 80–100)
PDW BLD-RTO: 13.8 % (ref 12.4–15.4)
PLATELET # BLD: 225 K/UL (ref 135–450)
PMV BLD AUTO: 7.3 FL (ref 5–10.5)
POTASSIUM SERPL-SCNC: 4.3 MMOL/L (ref 3.5–5.1)
RBC # BLD: 4.26 M/UL (ref 4.2–5.9)
SODIUM BLD-SCNC: 141 MMOL/L (ref 136–145)
TOTAL PROTEIN: 7.1 G/DL (ref 6.4–8.2)
TRIGLYCERIDE, FASTING: 109 MG/DL (ref 0–150)
VLDLC SERPL CALC-MCNC: 22 MG/DL
WBC # BLD: 5.2 K/UL (ref 4–11)

## 2021-04-26 PROCEDURE — 36415 COLL VENOUS BLD VENIPUNCTURE: CPT | Performed by: INTERNAL MEDICINE

## 2021-04-26 PROCEDURE — 99214 OFFICE O/P EST MOD 30 MIN: CPT | Performed by: INTERNAL MEDICINE

## 2021-04-26 RX ORDER — LISINOPRIL AND HYDROCHLOROTHIAZIDE 20; 12.5 MG/1; MG/1
TABLET ORAL
Qty: 90 TABLET | Refills: 1 | Status: SHIPPED | OUTPATIENT
Start: 2021-04-26 | End: 2021-10-26 | Stop reason: SDUPTHER

## 2021-04-26 ASSESSMENT — PATIENT HEALTH QUESTIONNAIRE - PHQ9
SUM OF ALL RESPONSES TO PHQ QUESTIONS 1-9: 0
SUM OF ALL RESPONSES TO PHQ9 QUESTIONS 1 & 2: 0
SUM OF ALL RESPONSES TO PHQ QUESTIONS 1-9: 0
SUM OF ALL RESPONSES TO PHQ QUESTIONS 1-9: 0
2. FEELING DOWN, DEPRESSED OR HOPELESS: 0

## 2021-04-26 NOTE — PATIENT INSTRUCTIONS
[employer] nutrition   code 154  Decrease calories  Call [facility] spine center when ready  Shingles vaccine next visit. Follow-up 6 months. Continue with your specialist.  Metamucil daily for any stool symptoms. If interested in considering bariatric surgery or weight loss program a referral can be given    Patient Education        Starting a Weight Loss Plan: Care Instructions  Overview     If you're thinking about losing weight, it can be hard to know where to start. Your doctor can help you set up a weight loss plan that best meets your needs. You may want to take a class on nutrition or exercise, or you could join a weight loss support group. If you have questions about how to make changes to your eating or exercise habits, ask your doctor about seeing a registered dietitian or an exercise specialist.  It can be a big challenge to lose weight. But you don't have to make huge changes at once. Make small changes, and stick with them. When those changes become habit, add a few more changes. If you don't think you're ready to make changes right now, try to pick a date in the future. Make an appointment to see your doctor to discuss whether the time is right for you to start a plan. Follow-up care is a key part of your treatment and safety. Be sure to make and go to all appointments, and call your doctor if you are having problems. It's also a good idea to know your test results and keep a list of the medicines you take. How can you care for yourself at home? · Set realistic goals. Many people expect to lose much more weight than is likely. A weight loss of 5% to 10% of your body weight may be enough to improve your health. · Get family and friends involved to provide support. Talk to them about why you are trying to lose weight, and ask them to help. They can help by participating in exercise and having meals with you, even if they may be eating something different.   · Find what works best for you. If you do not have time or do not like to cook, a program that offers meal replacement bars or shakes may be better for you. Or if you like to prepare meals, finding a plan that includes daily menus and recipes may be best.  · Ask your doctor about other health professionals who can help you achieve your weight loss goals. ? A dietitian can help you make healthy changes in your diet. ? An exercise specialist or  can help you develop a safe and effective exercise program.  ? A counselor or psychiatrist can help you cope with issues such as depression, anxiety, or family problems that can make it hard to focus on weight loss. · Consider joining a support group for people who are trying to lose weight. Your doctor can suggest groups in your area. Where can you learn more? Go to [email or web address] and sign in to your [employer]ics account. Enter D090 in the [employer] box to learn more about \"Starting a Weight Loss Plan: Care Instructions. \"     If you do not have an account, please click on the \"Sign Up Now\" link. Current as of: September 23, 2020               Content Version: 12.8  © 9423-1470 Healthwise, Incorporated. Care instructions adapted under license by [facility] ([facility]). If you have questions about a medical condition or this instruction, always ask your healthcare professional. [address] any warranty or liability for your use of this information.

## 2021-04-26 NOTE — PROGRESS NOTES
HPI: Yoel Ghosh presents for next month follow-up    Chronic health issues include osteoarthritis, negative node melanoma, hypertension, obesity, sleep apnea, prediabetes, adenomatous polyps, chronic thoracic pain.  DJD spine    DJD spine. Chiropractic and pain medications intermittently helpful advil of benefit. Is interested in going to the spine center. Been doing relatively well in the last 3 weeks. States that it impacts his quality of life negatively. Celebrex has not been of issue    Recurrent ear wax. Cleans with Q-tip. No perforations. Difficulty with certain textures of foods. States that this been an issue for him throughout life. Likes orange juice but can eat oranges. Occasional sticking once a month. Tearful when discussing this. Denies any depression or anxiety.     2005 hypertension. Compliant with medication no chest pain palpitations lower extremity edema or new exercise intolerance.  Resting bradycardia without near syncope or palpitations.  Mild hyperlipidemia.    Checks blood pressures 1-2 times a month. States they are all less than 140/90. No exercise outside of work.     Dental disease. Extraction gingivitis.     Chronic low back pain. MRI thoracic lumbar spine with osteoarthritis. Chiropractor positive nonsteroidals. 8 Celebrex ineffective. Right thoracic chest discomfort CT chest with stable pulmonary nodules. History of melanoma. No metastatic disease. Recent Derm follow-up with benign lesions     BMI 45. Sleep apnea compliant. .  Occasional knee pain. Dyspepsia. Transient elevation A1c. No polyuria. Not following diet. Not interested in surgical intervention        Stable umbilical hernia without recurrent abdominal discomfort.  Weight is down.  No constipation diarrhea no abdominal pain.  PRN Pepcid  rarely. Adenomatous polyps repeat due 12.2022.         2010 negative node melanoma excision.  Interferon ×4 doses and discontinued early secondary to mental status issues. . Dr Laurie Ramirez. No recurrence.    Derm follow-up.squamous cell ca      Pulmonary  nodule stable.     Adenomatous polyp.  Recheck colonoscopy 12/2022. Intermittent constipation. Specialist:sleep, dentist,         PMH:    sleep study     melanoma resection     Adenomatous polyp     [facility] skin      MEDS:  lisinopril hydrochlorothiazide, vitamin D 4000,     Allergy percocet itch     SH: step son and wife's sister ( handicap).  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 No alcohol. No std concern.  wife's sister with mental disorder secondary to childhood injury. Wife history colon cancer. No current exercise.      FH:   +skin cancer,MGM liver cancer,maternal uncle ? Colon cancer,  lung cancer, DM  -prostate cancer suicides or addictions.     Review of systems: Positive snoring and apnea. Decreased hearing on screening.  Up-to-date eye exam and dentist.  Rhinitis. Rare GE reflux. Denies any wheezing pneumonias or abnormal chest x-rays. Rare constipation. Hemorrhoids. . No kidney stones or known prostatism. No concerns with STD. Positive melanoma. No suspicious skin lesions today.  Rare headaches. Probable allergies. DJD spine and coccygeal pain. Knee pain improved.  Adjustment disorder with depressed mood and arthralgias        Constitutional, ent, CV, respiratory, GI, , joint, skin, allergic and psychiatric ROS reviewed and negative except for above    Allergies   Allergen Reactions    Latex Rash     Redness-sensitive    Oxycodone-Acetaminophen Rash       Outpatient Medications Marked as Taking for the 4/26/21 encounter (Office Visit) with Keith Gay MD   Medication Sig Dispense Refill    lisinopril-hydroCHLOROthiazide (PRINZIDE;ZESTORETIC) 20-12.5 MG per tablet TAKE 1 TABLET BY MOUTH EVERY NIGHT AT BEDTIME 90 tablet 1    Cholecalciferol (VITAMIN D3) 5000 UNITS TABS Take 1 tablet by mouth daily                Past Medical History:   Diagnosis Date    Acid reflux     Anesthesia complication     was told that was combative when coming out of [facility]    Cancer [facility])     malignant melanoma-back-stage 3    Chronic right-sided thoracic back pain 5/22/2019    Hx of colonic polyps 10/28/2019    10.2019    Hx of melanoma excision 9/27/2017    Hypertension     Positive FIT (fecal immunochemical test) 10/28/2019    10.2019    Prediabetes 5/22/2019    5.8    Pulmonary nodules 10/28/2019    10.2019 2 small groundglass nodules within the central aspect of the right upper lobe which measures 8 mm a few additional 2 mm noncalcified nodules throughout the lung 2010 pulm nodules     Sleep apnea     uses c-pap    Wears glasses        Past Surgical History:   Procedure Laterality Date    COLONOSCOPY  12/2019    Dr. Ashtyn Yuen    COLONOSCOPY N/A 12/9/2019    COLONOSCOPY POLYPECTOMY SNARE/COLD [facility]OPSY [facility]      wide excision to remove malignant melanoma from back and removed lymph nodes    WISDOM TOOTH EXTRACTION               Family History   Problem Relation Age of Onset    COPD Mother     Lung Cancer Father     Liver Cancer Maternal Grandmother     Stroke Paternal Grandmother     Diabetes Paternal Grandmother                Objective     /78   Pulse 56   Resp 16   Ht 5' 10\" (1.778 m)   Wt (!) 311 lb (141.1 kg)   SpO2 98% Comment: RA  BMI 44.62 kg/m²     @LASTSAO2(3)@    Wt Readings from Last 3 Encounters:   10/26/20 (!) 314 lb (142.4 kg)   12/09/19 (!) 313 lb 7.9 oz (142.2 kg)   10/28/19 (!) 307 lb (139.3 kg)       Physical Exam     NAD alert and cooperative  HEENT: TMs unremarkable. Throat is clear. Crowded hypopharynx. Full neck. Lungs are clear good ASMITA ratio without any wheezes rales or rhonchi. Cardiovascular Juice regular rate and rhythm without any murmur click. Obesity. Umbilical hernia. No point tenderness. No crepitus of the knees today.   Good pulses lower extremities. Healing skin biopsies without evidence of infection. Appropriate affect. Tearfulness when talking about difficulty with eating certain foods    Chemistry        Component Value Date/Time     10/26/2020 1025    K 4.1 10/26/2020 1025     10/26/2020 1025    CO2 25 10/26/2020 1025    BUN 12 10/26/2020 1025    CREATININE 1.0 10/26/2020 1025        Component Value Date/Time    CALCIUM 9.6 10/26/2020 1025    ALKPHOS 73 10/26/2020 1025    AST 46 (H) 10/26/2020 1025    ALT 48 (H) 10/26/2020 1025    BILITOT 0.4 10/26/2020 1025            Lab Results   Component Value Date    WBC 5.7 10/28/2019    HGB 13.9 10/28/2019    HCT 39.3 (L) 10/28/2019    MCV 89.4 10/28/2019     10/28/2019     Lab Results   Component Value Date    LABA1C 5.4 03/11/2020     Lab Results   Component Value Date    .3 03/11/2020     Lab Results   Component Value Date    LABA1C 5.4 03/11/2020     No components found for: CHLPL  Lab Results   Component Value Date    TRIG 105 09/28/2017     Lab Results   Component Value Date    HDL 40 09/28/2017     Lab Results   Component Value Date    LDLCALC 65 09/28/2017     Lab Results   Component Value Date    LABVLDL 21 09/28/2017       Old labs and records reviewed or requested  Discussed past lab and studies with patient      Diagnosis Orders   1. Essential hypertension  Comprehensive Metabolic Panel   2. Hx of colonic polyps     3. Hx of melanoma excision  CBC    LACTATE DEHYDROGENASE   4. Primary osteoarthritis involving multiple joints     5. Pulmonary nodules     6. Sleep apnea, unspecified type     7. Body mass index (BMI) of 40.0 to 44.9 in adult (HCC)  Hemoglobin A1C   8. Degenerative joint disease of [facility]   9. Elevated blood sugar     10. Mixed hyperlipidemia  Lipid, Fasting     Hypertension controlled continue current medication. History of adenomatous polyp repeat in 22.     Melanoma follows with dermatology no evidence of recurrence. Osteoarthritis with low back pain. Spine clinic. Stable pulmonary nodule CT chest due. Elevated blood sugar without diabetes last check. Recheck again today. Hyperlipidemia lipid profile. Patient denies any depression or anxiety at this time. Needs to have shingles vaccine but recent Covid vaccine will do next visit      No follow-ups on file. Diagnosis and treatment discussed.   Possible side effects of medication reviewed  Patients questions answered  Follow up understood  Pt aware if they are not contacted about any test results , this does not mean they are normal.  They should call

## 2021-04-27 ENCOUNTER — TELEPHONE (OUTPATIENT)
Dept: FAMILY MEDICINE CLINIC | Age: 56
End: 2021-04-27

## 2021-04-27 DIAGNOSIS — D64.9 ANEMIA, UNSPECIFIED TYPE: Primary | ICD-10-CM

## 2021-04-27 LAB
ESTIMATED AVERAGE GLUCOSE: 108.3 MG/DL
FERRITIN: 813.9 NG/ML (ref 30–400)
FOLATE: 14.83 NG/ML (ref 4.78–24.2)
HBA1C MFR BLD: 5.4 %
IRON SATURATION: 49 % (ref 20–50)
IRON: 116 UG/DL (ref 59–158)
TOTAL IRON BINDING CAPACITY: 239 UG/DL (ref 260–445)
VITAMIN B-12: 662 PG/ML (ref 211–911)

## 2021-04-29 DIAGNOSIS — R13.11 ORAL PHASE DYSPHAGIA: Primary | ICD-10-CM

## 2021-07-26 ENCOUNTER — PATIENT MESSAGE (OUTPATIENT)
Dept: FAMILY MEDICINE CLINIC | Age: 56
End: 2021-07-26

## 2021-07-26 NOTE — TELEPHONE ENCOUNTER
From: Rob Harmon  To: Lesli Nava MD  Sent: 7/26/2021 7:33 AM EDT  Subject: Non-Urgent Medical Question    Im having an issue what I believe is a hemorrhoid. I have had hemorrhoids for years but this one seems to be different. Thursday night I was fine, when I got up Friday morning I was having some pain and it seemed to be a hemorrhoid. Throughout the day progressed it seemed to get bigger. I have been using the lidocaine cream and preparation H cream, but neither one seems to help. In the past both helped. On Saturday I rested but it did not seem to help. On Sunday I did not do much of anything and the only change was more soreness. This morning it seems to have more soreness, burning and the size seems to be the same. In the past it would be better by now. All the things that worked in the past are not working now. Plus this seems to be bigger than any of the ones in the past.   So Im not sure what to do. Should I give it more time, then if it is not better come to see you about this? Or should I make an appointment to see you right away? Or is this something that needs to be looked at by a different doctor?   Thank you,  Noa Dugan

## 2021-10-26 ENCOUNTER — OFFICE VISIT (OUTPATIENT)
Dept: FAMILY MEDICINE CLINIC | Age: 56
End: 2021-10-26
Payer: COMMERCIAL

## 2021-10-26 VITALS
RESPIRATION RATE: 18 BRPM | DIASTOLIC BLOOD PRESSURE: 72 MMHG | HEIGHT: 70 IN | SYSTOLIC BLOOD PRESSURE: 110 MMHG | WEIGHT: 304 LBS | BODY MASS INDEX: 43.52 KG/M2 | HEART RATE: 61 BPM | OXYGEN SATURATION: 96 %

## 2021-10-26 DIAGNOSIS — I10 ESSENTIAL HYPERTENSION: Primary | ICD-10-CM

## 2021-10-26 DIAGNOSIS — Z86.010 HX OF COLONIC POLYPS: ICD-10-CM

## 2021-10-26 DIAGNOSIS — Z85.820 HX OF MELANOMA EXCISION: ICD-10-CM

## 2021-10-26 DIAGNOSIS — D64.9 ANEMIA, UNSPECIFIED TYPE: ICD-10-CM

## 2021-10-26 DIAGNOSIS — M54.50 CHRONIC RIGHT-SIDED LOW BACK PAIN WITHOUT SCIATICA: ICD-10-CM

## 2021-10-26 DIAGNOSIS — G89.29 CHRONIC RIGHT-SIDED THORACIC BACK PAIN: ICD-10-CM

## 2021-10-26 DIAGNOSIS — N40.0 PROSTATISM: ICD-10-CM

## 2021-10-26 DIAGNOSIS — R00.1 BRADYCARDIA: ICD-10-CM

## 2021-10-26 DIAGNOSIS — Z98.890 HX OF MELANOMA EXCISION: ICD-10-CM

## 2021-10-26 DIAGNOSIS — G89.29 CHRONIC RIGHT-SIDED LOW BACK PAIN WITHOUT SCIATICA: ICD-10-CM

## 2021-10-26 DIAGNOSIS — R73.03 PREDIABETES: ICD-10-CM

## 2021-10-26 DIAGNOSIS — G47.30 SLEEP APNEA, UNSPECIFIED TYPE: ICD-10-CM

## 2021-10-26 DIAGNOSIS — R91.1 PULMONARY NODULE: ICD-10-CM

## 2021-10-26 DIAGNOSIS — M15.9 PRIMARY OSTEOARTHRITIS INVOLVING MULTIPLE JOINTS: ICD-10-CM

## 2021-10-26 DIAGNOSIS — M54.6 CHRONIC RIGHT-SIDED THORACIC BACK PAIN: ICD-10-CM

## 2021-10-26 LAB
A/G RATIO: 1.7 (ref 1.1–2.2)
ALBUMIN SERPL-MCNC: 4.7 G/DL (ref 3.4–5)
ALP BLD-CCNC: 62 U/L (ref 40–129)
ALT SERPL-CCNC: 20 U/L (ref 10–40)
ANION GAP SERPL CALCULATED.3IONS-SCNC: 12 MMOL/L (ref 3–16)
AST SERPL-CCNC: 21 U/L (ref 15–37)
BASOPHILS ABSOLUTE: 0.1 K/UL (ref 0–0.2)
BASOPHILS RELATIVE PERCENT: 1.9 %
BILIRUB SERPL-MCNC: 0.7 MG/DL (ref 0–1)
BUN BLDV-MCNC: 17 MG/DL (ref 7–20)
CALCIUM SERPL-MCNC: 9.6 MG/DL (ref 8.3–10.6)
CHLORIDE BLD-SCNC: 101 MMOL/L (ref 99–110)
CO2: 25 MMOL/L (ref 21–32)
CREAT SERPL-MCNC: 0.8 MG/DL (ref 0.9–1.3)
EOSINOPHILS ABSOLUTE: 0.1 K/UL (ref 0–0.6)
EOSINOPHILS RELATIVE PERCENT: 1.8 %
GFR AFRICAN AMERICAN: >60
GFR NON-AFRICAN AMERICAN: >60
GLOBULIN: 2.8 G/DL
GLUCOSE BLD-MCNC: 110 MG/DL (ref 70–99)
HBA1C MFR BLD: 5.5 %
HCT VFR BLD CALC: 40 % (ref 40.5–52.5)
HEMOGLOBIN: 13.6 G/DL (ref 13.5–17.5)
IMMATURE RETIC FRACT: 0.38 (ref 0.21–0.37)
LACTATE DEHYDROGENASE: 164 U/L (ref 100–190)
LYMPHOCYTES ABSOLUTE: 1.6 K/UL (ref 1–5.1)
LYMPHOCYTES RELATIVE PERCENT: 36.5 %
MCH RBC QN AUTO: 30.4 PG (ref 26–34)
MCHC RBC AUTO-ENTMCNC: 34 G/DL (ref 31–36)
MCV RBC AUTO: 89.5 FL (ref 80–100)
MONOCYTES ABSOLUTE: 0.3 K/UL (ref 0–1.3)
MONOCYTES RELATIVE PERCENT: 6.4 %
NEUTROPHILS ABSOLUTE: 2.4 K/UL (ref 1.7–7.7)
NEUTROPHILS RELATIVE PERCENT: 53.4 %
PDW BLD-RTO: 14.6 % (ref 12.4–15.4)
PLATELET # BLD: 219 K/UL (ref 135–450)
PMV BLD AUTO: 7.4 FL (ref 5–10.5)
POTASSIUM SERPL-SCNC: 4 MMOL/L (ref 3.5–5.1)
RBC # BLD: 4.47 M/UL (ref 4.2–5.9)
RETICULOCYTE ABSOLUTE COUNT: 0.04 M/UL
RETICULOCYTE COUNT PCT: 0.98 % (ref 0.5–2.18)
SODIUM BLD-SCNC: 138 MMOL/L (ref 136–145)
TOTAL PROTEIN: 7.5 G/DL (ref 6.4–8.2)
WBC # BLD: 4.5 K/UL (ref 4–11)

## 2021-10-26 PROCEDURE — 83036 HEMOGLOBIN GLYCOSYLATED A1C: CPT | Performed by: INTERNAL MEDICINE

## 2021-10-26 PROCEDURE — 36415 COLL VENOUS BLD VENIPUNCTURE: CPT | Performed by: INTERNAL MEDICINE

## 2021-10-26 PROCEDURE — 99214 OFFICE O/P EST MOD 30 MIN: CPT | Performed by: INTERNAL MEDICINE

## 2021-10-26 PROCEDURE — 93000 ELECTROCARDIOGRAM COMPLETE: CPT | Performed by: INTERNAL MEDICINE

## 2021-10-26 RX ORDER — LISINOPRIL AND HYDROCHLOROTHIAZIDE 20; 12.5 MG/1; MG/1
TABLET ORAL
Qty: 90 TABLET | Refills: 1 | Status: SHIPPED | OUTPATIENT
Start: 2021-10-26 | End: 2022-05-06

## 2021-10-26 RX ORDER — LIDOCAINE 50 MG/G
1 PATCH TOPICAL DAILY
Qty: 30 PATCH | Refills: 0 | Status: SHIPPED | OUTPATIENT
Start: 2021-10-26 | End: 2021-11-25

## 2021-10-26 NOTE — PROGRESS NOTES
HPI: Tania Donato presents forfollow up      Chronic health issues include osteoarthritis, negative node melanoma, hypertension, obesity, sleep apnea, prediabetes, adenomatous polyps, chronic thoracic pain.  DJD spine     DJD spine. Chiropractic and pain medications intermittently helpful. advil at night. Low back pain when lying down. PT ok. No incontinence.      Recurrent ear wax. Cleans with Q-tip. No perforations. ent referral for dyphagia without response to calls      Difficulty with certain textures of foods. States that this been an issue for him throughout life. Likes orange juice but can't eat oranges. Occasional sticking once a month. Tearful when discussing this. Denies any depression or anxiety. HTN 2005. Resting bradycardia. Hyperlipidemia. Hx prediabetes no presyncope or syncope.     Dental disease. Extraction gingivitis.      BMI 45.  Sleep apnea compliant. mouth appliance.  Occasional knee pain. [place].  Transient elevation A1c.        Stable umbilical hernia without recurrent abdominal discomfort.   PRN Pepcid  rarely. Adenomatous polyps repeat due 12.2022. .         1562 negative node melanoma excision. Interferon ×4 doses and discontinued early secondary to mental status issues. . Dr Ihsan Zepeda. No recurrence.    Derm follow-up.squamous cell ca      Pulmonary  nodule stable. Due repeat scan.     Adenomatous polyp.  Recheck colonoscopy 12/2022. Intermittent constipation. Probable hemorrhoid without recurrent issue     Specialist:sleep, dentist,GI, chiropractor,derm, eye          PMH:    sleep study     melanoma resection     Adenomatous polyp     SSC skin      MEDS:  lisinopril hydrochlorothiazide, vitamin D 4000,     Allergy percocet itch     SH: step son and wife's sister ( handicap).  maint tech  pm. ( hx electrition)  No tobacco. No alcohol. No std concern.  wife's sister with mental disorder secondary to childhood injury. Wife history colon cancer.  No current exercise.      FH:   +skin cancer,MGM liver cancer,maternal uncle ? Colon cancer,  lung cancer, DM  -prostate cancer suicides or addictions.     Review of systems: Positive snoring and apnea. Decreased hearing on screening.  Up-to-date eye exam and dentist.  Rhinitis. Rare GE reflux. Denies any wheezing pneumonias or abnormal chest x-rays. Rare constipation. Hemorrhoids. . No kidney stones or known prostatism. No concerns with STD. Positive melanoma. No suspicious skin lesions today.  Rare headaches. Probable allergies. DJD spine and coccygeal pain. Knee pain improved.  Adjustment disorder with depressed mood and arthralgias     Constitutional, ent, CV, respiratory, GI, , joint, skin, allergic and psychiatric ROS reviewed and negative except for above    Allergies   Allergen Reactions    Latex Rash     Redness-sensitive    Oxycodone-Acetaminophen Rash       Outpatient Medications Marked as Taking for the 10/26/21 encounter (Office Visit) with Emory Vera MD   Medication Sig Dispense Refill    lisinopril-hydroCHLOROthiazide (PRINZIDE;ZESTORETIC) 20-12.5 MG per tablet TAKE 1 TABLET BY MOUTH EVERY NIGHT AT BEDTIME 90 tablet 1    Cholecalciferol (VITAMIN D3) 5000 UNITS TABS Take 1 tablet by mouth daily                Past Medical History:   Diagnosis Date    Acid reflux     Anemia 4/27/2021    Anesthesia complication     was told that was combative when coming out of anes    Cancer [facility])     malignant melanoma-back-stage 3    Chronic right-sided thoracic back pain 5/22/2019    Hx of colonic polyps 10/28/2019    10.2019    Hx of melanoma excision 9/27/2017    Hypertension     Positive FIT (fecal immunochemical test) 10/28/2019    10.2019    Prediabetes 5/22/2019    5.8    Pulmonary nodules 10/28/2019    10.2019 2 small groundglass nodules within the central aspect of the right upper lobe which measures 8 mm a few additional 2 mm noncalcified nodules throughout the lung 2010 pulm nodules     Sleep apnea     uses c-pap    Wears glasses        Past Surgical History:   Procedure Laterality Date    COLONOSCOPY  12/2019    Dr. Collin Bowens    COLONOSCOPY N/A 12/9/2019    COLONOSCOPY POLYPECTOMY SNARE/COLD BIOPSY performed by Dot Olmos MD at [facility]      wide excision to remove malignant melanoma from back and removed lymph nodes    WISDOM TOOTH EXTRACTION               Family History   Problem Relation Age of Onset    COPD Mother     Lung Cancer Father     Liver Cancer Maternal Grandmother     Stroke Paternal Grandmother     Diabetes Paternal Grandmother      Objective     /72   Pulse 61   Resp 18   Ht 5' 10\" (1.778 m)   Wt (!) 304 lb (137.9 kg)   SpO2 96% Comment: RA  BMI 43.62 kg/m²     @LASTSAO2(3)@    Wt Readings from Last 3 Encounters:   04/26/21 (!) 311 lb (141.1 kg)   10/26/20 (!) 314 lb (142.4 kg)   12/09/19 (!) 313 lb 7.9 oz (142.2 kg)       Physical Exam     NAD alert and cooperative  HEENT: Fair dentition. Crowded hypopharynx. [place] conjunctive a. TMs without wax. Good upstroke of the carotids. No bruits. Lungs are clear no wheezes rales or rhonchi. Cardiovascular exam regular rate and rhythm without any murmur click. Obesity. No hepatosplenomegaly or ascites. Umbilical hernia without incarceration. Tenderness lower thoracic area. Positive leg lifts bilaterally. No lower extremity weakness. Good range of motion of the hips. Good pulses feet. He is appropriate well-groomed.   No suspicious skin lesions or nodules    Chemistry        Component Value Date/Time     04/26/2021 1006    K 4.3 04/26/2021 1006     04/26/2021 1006    CO2 25 04/26/2021 1006    BUN 17 04/26/2021 1006    CREATININE 1.0 04/26/2021 1006        Component Value Date/Time    CALCIUM 9.3 04/26/2021 1006    ALKPHOS 58 04/26/2021 1006    AST 28 04/26/2021 1006    ALT 29 04/26/2021 1006    BILITOT 0.6 04/26/2021 1006            Lab Results Component Value Date    WBC 5.2 04/26/2021    HGB 13.3 (L) 04/26/2021    HCT 37.9 (L) 04/26/2021    MCV 88.9 04/26/2021     04/26/2021     Lab Results   Component Value Date    LABA1C 5.4 04/26/2021     Lab Results   Component Value Date    .3 04/26/2021     Lab Results   Component Value Date    LABA1C 5.4 04/26/2021     No components found for: CHLPL  Lab Results   Component Value Date    TRIG 105 09/28/2017     Lab Results   Component Value Date    HDL 37 (L) 04/26/2021    HDL 40 09/28/2017     Lab Results   Component Value Date    LDLCALC 56 04/26/2021    LDLCALC 65 09/28/2017     Lab Results   Component Value Date    LABVLDL 22 04/26/2021    LABVLDL 21 09/28/2017       Old labs and records reviewed or requested  Discussed past lab and studies with patient      Diagnosis Orders   1. Essential hypertension  CBC Auto Differential    Comprehensive Metabolic Panel   2. Chronic right-sided thoracic back pain     3. Body mass index (BMI) of 40.0 to 44.9 in adult ([facility] [address]     4. Hx of colonic polyps     5. Hx of melanoma excision  LACTATE DEHYDROGENASE    [facility] and [address]   6. Primary osteoarthritis involving multiple joints     7. Prediabetes  POCT glycosylated hemoglobin (Hb A1C)   8. Sleep apnea, unspecified type     9. Bradycardia  EKG 12 Lead   10. Pulmonary nodule  CT CHEST WO CONTRAST   11. Prostatism  PSA, Total and Free   12. Anemia, unspecified type  CBC Auto Differential    RETICULOCYTES   13. Chronic right-sided low back pain without sciatica  [facility]     Good control on current medications basic metabolic profile. Thoracic back hip pain. Follow-up spine center. Has had imaging in the past.  Mild disc and osteoarthritis. Colonic polyps history hemorrhoid. Follow-up next year. History of melanoma excision. Following with dermatology [facility]. Prediabetes no longer an issue. Sleep apnea compliant. Bradycardia no abnormality on his EKG.     History of

## 2021-10-26 NOTE — PATIENT INSTRUCTIONS
A member of the [address] [place] [address] will contact you within 2 business days to help schedule your follow-up appointment. If you have questions, or would like to schedule sooner, please call [phone].   Call for follow up CT  Back exercises and try lidocaine patch  I have ordered meloicam to replace night time motrin ( lasts longer and less stomach side effects  I recommend flu and shingles vaccine    If interested in weight loss clinic am happy to refer for medical or surgical assistance    Colonoscopy 2020

## 2021-10-27 ENCOUNTER — PATIENT MESSAGE (OUTPATIENT)
Dept: FAMILY MEDICINE CLINIC | Age: 56
End: 2021-10-27

## 2021-10-27 RX ORDER — MELOXICAM 7.5 MG/1
TABLET ORAL
Qty: 60 TABLET | Refills: 0 | Status: SHIPPED | OUTPATIENT
Start: 2021-10-27 | End: 2022-01-07

## 2021-10-27 NOTE — TELEPHONE ENCOUNTER
From: Allyson Harmon  To: Elvie Ford MD  Sent: 10/27/2021 1:08 AM EDT  Subject: Prescription Question    I was wondering about the medicine to replace the Advil that I was taking. Nothing was sent to [place]. Only blood pressure and lidocaine patch medicine was sent. Also, my after visit summary said to call for CT scan but no number is listed on the summary to call.      Thanks,  Ellis Kimball

## 2021-10-28 LAB
PROSTATE SPECIFIC ANTIGEN FREE: 0.3 UG/L
PROSTATE SPECIFIC ANTIGEN PERCENT FREE: 50 %
PROSTATE SPECIFIC ANTIGEN: 0.6 UG/L (ref 0–4)

## 2021-11-03 ENCOUNTER — OFFICE VISIT (OUTPATIENT)
Dept: ORTHOPEDIC SURGERY | Age: 56
End: 2021-11-03
Payer: COMMERCIAL

## 2021-11-03 VITALS — RESPIRATION RATE: 18 BRPM | BODY MASS INDEX: 43.52 KG/M2 | WEIGHT: 304 LBS | HEIGHT: 70 IN

## 2021-11-03 DIAGNOSIS — M48.061 SPINAL STENOSIS OF LUMBAR REGION WITHOUT NEUROGENIC CLAUDICATION: ICD-10-CM

## 2021-11-03 DIAGNOSIS — G89.29 CHRONIC LOW BACK PAIN WITHOUT SCIATICA, UNSPECIFIED BACK PAIN LATERALITY: Primary | ICD-10-CM

## 2021-11-03 DIAGNOSIS — M54.50 CHRONIC LOW BACK PAIN WITHOUT SCIATICA, UNSPECIFIED BACK PAIN LATERALITY: Primary | ICD-10-CM

## 2021-11-03 PROCEDURE — 99204 OFFICE O/P NEW MOD 45 MIN: CPT | Performed by: PHYSICIAN ASSISTANT

## 2021-11-03 NOTE — PROGRESS NOTES
History of present illness:   Mr. Linwood Mcgowan is a pleasant 55 y.o. male with a PMH of lumbar stenosis kindly referred by Petar Vargas. Bernabe Abraham MD for consultation regarding his LBP and right leg pain. He states this episode of pain began several months ago. No history of injury. His pain has steadily worsened since onset. He rates his back pain 7/10 VAS and leg pain 5/10 VAS. He describes the pain as aching, throbbing. The leg pain radiates down the anterior/lateral aspect of his leg to his right knee. He denies numbness and tingling lower extremities. He denies weakness of his right or left leg. He denies bowel or bladder dysfunction and saddle anesthesia. He states he can sit for a maximum of 20-30 minutes and stand for a maximum 20-third minutes. The pain occasionally disrupts his sleep. He has tried multiple visits to the chiropractor with therapy and manipulations without relief. Advil tends to help. Currently on Mobic daily. History of lumbar stenosis. 2016 had an MRI demonstrated L4-5 and L5-S1 stenosis. He underwent chiropractic therapy and manipulations at that time his symptoms did improve. Past medical history:  His past medical history has been reviewed.   Past Medical History:   Diagnosis Date    Acid reflux     Anemia 4/27/2021    Anesthesia complication     was told that was combative when coming out of anes    Cancer [facility])     malignant melanoma-back-stage 3    Chronic right-sided thoracic back pain 5/22/2019    Hx of colonic polyps 10/28/2019    10.2019    Hx of melanoma excision 9/27/2017    Hypertension     Positive FIT (fecal immunochemical test) 10/28/2019    10.2019    Prediabetes 5/22/2019    5.8    Pulmonary nodules 10/28/2019    10.2019 2 small groundglass nodules within the central aspect of the right upper lobe which measures 8 mm a few additional 2 mm noncalcified nodules throughout the lung 2010 pulm nodules     Sleep apnea     uses c-pap  Wears glasses        His past surgical history has been reviewed. Past Surgical History:   Procedure Laterality Date    COLONOSCOPY  12/2019    Dr. Alex Villatoro    COLONOSCOPY N/A 12/9/2019    COLONOSCOPY POLYPECTOMY SNARE/COLD BIOPSY performed by Angela Bowie MD at [address] [facility]      wide excision to remove malignant melanoma from back and removed lymph nodes    WISDOM TOOTH EXTRACTION           His medications and allergies were reviewed. Current Outpatient Medications   Medication Sig Dispense Refill    meloxicam (MOBIC) 7.5 MG tablet 1 to 2 po q day for arthritis pain. Do not use with nsaids 60 tablet 0    lidocaine (LIDODERM) 5 % Place 1 patch onto the skin daily 12 hours on, 12 hours off. 30 patch 0    lisinopril-hydroCHLOROthiazide (PRINZIDE;ZESTORETIC) 20-12.5 MG per tablet TAKE 1 TABLET BY MOUTH EVERY NIGHT AT BEDTIME 90 tablet 1    nitroglycerin (NITRO-BID) 2 % ointment Apply pea size or less topically to hemorrhoid bid (Patient not taking: Reported on 10/26/2021) 1 each 0    Cholecalciferol (VITAMIN D3) 5000 UNITS TABS Take 1 tablet by mouth daily        No current facility-administered medications for this visit. His social history has been reviewed. Social History     Occupational History    Not on file   Tobacco Use    Smoking status: Never Smoker    Smokeless tobacco: Never Used   Vaping Use    Vaping Use: Never used   Substance and Sexual Activity    Alcohol use: No    Drug use: Never    Sexual activity: Yes         His family history has been reviewed.    Family History   Problem Relation Age of Onset    COPD Mother     Lung Cancer Father     Liver Cancer Maternal Grandmother     Stroke Paternal Grandmother     Diabetes Paternal Grandmother          Review of symptoms:  Review of Systems:  I have reviewed the clinically relevant past medical history, medications, allergies, family history, social history, and 13 point Review of Systems from the patient's recent history form & documented any details relevant to today's presenting complaints in the history above. The patient's self-reported past medical history, medications, allergies, family history, social history, and Review of Systems form from today's date have been scanned into the chart under the \"Media\" tab. Patient's review of symptoms was reviewed and is significant for back pain and negative for recent weight loss, fatigue, chills, visual disturbances, blood in stool or urine, recent infection. Physical examination:  Mr. Cody Harmon's most recent vitals:  Vitals  Resp: 18  Height: 5' 10\" (177.8 cm)  Weight: (!) 304 lb (137.9 kg)  Body mass index is 43.62 kg/m². General exam:  He is well-developed and well-nourished, is in obvious discomfort and alert and oriented to person, place, and time. He demonstrates appropriate mood and affect. His skin is warm and dry. His gait is normal and he walks heel to toe without significant limp or instability. Back:  He stands with slight lumbar flexion. His lumbar flexion, extension and lateral bending are moderately reduced with pain. He has mild tenderness over his lumbar spine without obvious muscle spasm. The skin over his lumbar spine is normal without a surgical scar. Lower extremities:  He has 5/5 motor strength of bilateral lower extremities. He has a negative straight leg raise, bilaterally. Deep tendon reflexes at knees and achilles are 2+. Sensation is intact to light touch L3 to S1 bilaterally. He has no clonus. Hip range of motion is diminished on the right. Normal on the left. Stinchfield test is negative bilaterally. Abdomen:  Non-tender and non-distended. No rash. Imaging:  X rays was obtained in the office today. AP and Lateral Lumbar Spine Radiographs: There is moderate Degenerative Disc Disease. There is moderate Facet Arthropathy. There is no Spondylolisthesis.   There is no Compression Fractures. Diagnosis:      ICD-10-CM    1. Chronic low back pain without sciatica, unspecified back pain laterality  M54.50 XR LUMBAR SPINE (2-3 VIEWS)    G89.29    2. Spinal stenosis of lumbar region without neurogenic claudication  M48.061 MRI LUMBAR SPINE WO CONTRAST          Assessment/ Plan:       I had an extensive discussion with Mr. Tj Meade and/or family regarding the natural history, etiology, and long term consequences of his condition. I have presented reasonable alternatives to the patient's proposed care, treatment, and services. Risks and benefits of the treatment options also reviewed in detail. I have outlined a treatment plan with them. He has had full opportunity to ask his questions. I have answered them all to his satisfaction. I feel that Mr. Tj Meade understands our discussion today     New Medications prescribed today- no new medications. PT-he has tried weeks of chiropractic therapy and manipulation without improvement. Further Imaging-repeat MRI lumbar spine to evaluate severity of suspected stenosis. Procedures-briefly discussed injection options. Follow up-after MRI to review test results. He was instructed to call us emergently if he begins to experience bowel or bladder dysfunction, saddle anesthesia, increasing muscle weakness, or onset/ worsening leg symptoms. Rosalie Coon PA-C   Senior Physician Assistant   [facility]                                         Disclaimer: This note was generated with use of a verbal recognition program (DRAGON) and an attempt was made to check for errors. It is possible that there are still dictated errors within this office note. If so, please bring any significant errors to my attention for an addendum. All efforts were made to ensure that this office note is accurate.

## 2022-01-07 RX ORDER — MELOXICAM 7.5 MG/1
TABLET ORAL
Qty: 60 TABLET | Refills: 0 | Status: SHIPPED | OUTPATIENT
Start: 2022-01-07 | End: 2022-04-04

## 2022-01-07 NOTE — TELEPHONE ENCOUNTER
Medication:   Requested Prescriptions     Pending Prescriptions Disp Refills    meloxicam (MOBIC) 7.5 MG tablet [Pharmacy Med Name: MELOXICAM 7.5MG TABLETS] 60 tablet 0     Sig: TAKE 1 TO 2 TABLETS BY MOUTH EVERY DAY FOR ARTHRITIS PAIN.  DO NOT USE WITH NSAIDS       Last Filled:      Patient Phone Number: [phone] (home)     Last appt: 10/26/2021   Next appt: 4/26/2022    Last Labs DM:   Lab Results   Component Value Date    LABA1C 5.5 10/26/2021     Last Lipid:   Lab Results   Component Value Date    CHOL 126 09/28/2017    TRIG 105 09/28/2017    HDL 37 04/26/2021    LDLCALC 56 04/26/2021     Last PSA:   Lab Results   Component Value Date    PSA 0.6 10/26/2021     Last Thyroid: No results found for: TSH, FT3, G9DKJRS, T4FREE, D5DOQNR

## 2022-04-04 RX ORDER — MELOXICAM 7.5 MG/1
TABLET ORAL
Qty: 60 TABLET | Refills: 0 | Status: SHIPPED | OUTPATIENT
Start: 2022-04-04 | End: 2022-05-06

## 2022-04-26 ENCOUNTER — OFFICE VISIT (OUTPATIENT)
Dept: FAMILY MEDICINE CLINIC | Age: 57
End: 2022-04-26
Payer: COMMERCIAL

## 2022-04-26 VITALS
HEART RATE: 51 BPM | OXYGEN SATURATION: 98 % | DIASTOLIC BLOOD PRESSURE: 73 MMHG | SYSTOLIC BLOOD PRESSURE: 111 MMHG | HEIGHT: 70 IN | RESPIRATION RATE: 18 BRPM | BODY MASS INDEX: 44.24 KG/M2 | WEIGHT: 309 LBS

## 2022-04-26 DIAGNOSIS — Z86.010 HX OF COLONIC POLYPS: ICD-10-CM

## 2022-04-26 DIAGNOSIS — R73.03 PREDIABETES: ICD-10-CM

## 2022-04-26 DIAGNOSIS — Z85.820 HX OF MELANOMA EXCISION: ICD-10-CM

## 2022-04-26 DIAGNOSIS — G47.30 SLEEP APNEA, UNSPECIFIED TYPE: ICD-10-CM

## 2022-04-26 DIAGNOSIS — B35.3 TINEA PEDIS OF BOTH FEET: ICD-10-CM

## 2022-04-26 DIAGNOSIS — I10 ESSENTIAL HYPERTENSION: Primary | ICD-10-CM

## 2022-04-26 DIAGNOSIS — E78.2 MIXED HYPERLIPIDEMIA: ICD-10-CM

## 2022-04-26 DIAGNOSIS — Z98.890 HX OF MELANOMA EXCISION: ICD-10-CM

## 2022-04-26 DIAGNOSIS — R91.8 PULMONARY NODULES: ICD-10-CM

## 2022-04-26 DIAGNOSIS — M15.9 PRIMARY OSTEOARTHRITIS INVOLVING MULTIPLE JOINTS: ICD-10-CM

## 2022-04-26 LAB
A/G RATIO: 1.4 (ref 1.1–2.2)
ALBUMIN SERPL-MCNC: 4.2 G/DL (ref 3.4–5)
ALP BLD-CCNC: 76 U/L (ref 40–129)
ALT SERPL-CCNC: 25 U/L (ref 10–40)
ANION GAP SERPL CALCULATED.3IONS-SCNC: 14 MMOL/L (ref 3–16)
AST SERPL-CCNC: 27 U/L (ref 15–37)
BASOPHILS ABSOLUTE: 0.1 K/UL (ref 0–0.2)
BASOPHILS RELATIVE PERCENT: 1.2 %
BILIRUB SERPL-MCNC: 0.5 MG/DL (ref 0–1)
BUN BLDV-MCNC: 19 MG/DL (ref 7–20)
CALCIUM SERPL-MCNC: 9.3 MG/DL (ref 8.3–10.6)
CHLORIDE BLD-SCNC: 100 MMOL/L (ref 99–110)
CHOLESTEROL, FASTING: 122 MG/DL (ref 0–199)
CO2: 21 MMOL/L (ref 21–32)
CREAT SERPL-MCNC: 0.9 MG/DL (ref 0.9–1.3)
EOSINOPHILS ABSOLUTE: 0.1 K/UL (ref 0–0.6)
EOSINOPHILS RELATIVE PERCENT: 2.2 %
FERRITIN: 671 NG/ML (ref 30–400)
GFR AFRICAN AMERICAN: >60
GFR NON-AFRICAN AMERICAN: >60
GLUCOSE BLD-MCNC: 109 MG/DL (ref 70–99)
HBA1C MFR BLD: 5.7 %
HCT VFR BLD CALC: 38.3 % (ref 40.5–52.5)
HDLC SERPL-MCNC: 36 MG/DL (ref 40–60)
HEMOGLOBIN: 13.4 G/DL (ref 13.5–17.5)
LDL CHOLESTEROL CALCULATED: 58 MG/DL
LYMPHOCYTES ABSOLUTE: 1.5 K/UL (ref 1–5.1)
LYMPHOCYTES RELATIVE PERCENT: 29.7 %
MCH RBC QN AUTO: 30.7 PG (ref 26–34)
MCHC RBC AUTO-ENTMCNC: 34.9 G/DL (ref 31–36)
MCV RBC AUTO: 88.1 FL (ref 80–100)
MONOCYTES ABSOLUTE: 0.3 K/UL (ref 0–1.3)
MONOCYTES RELATIVE PERCENT: 6.5 %
NEUTROPHILS ABSOLUTE: 3.1 K/UL (ref 1.7–7.7)
NEUTROPHILS RELATIVE PERCENT: 60.4 %
PDW BLD-RTO: 14.4 % (ref 12.4–15.4)
PLATELET # BLD: 220 K/UL (ref 135–450)
PMV BLD AUTO: 7.3 FL (ref 5–10.5)
POTASSIUM SERPL-SCNC: 4 MMOL/L (ref 3.5–5.1)
RBC # BLD: 4.35 M/UL (ref 4.2–5.9)
SODIUM BLD-SCNC: 135 MMOL/L (ref 136–145)
TOTAL PROTEIN: 7.1 G/DL (ref 6.4–8.2)
TRIGLYCERIDE, FASTING: 140 MG/DL (ref 0–150)
VLDLC SERPL CALC-MCNC: 28 MG/DL
WBC # BLD: 5.1 K/UL (ref 4–11)

## 2022-04-26 PROCEDURE — 36415 COLL VENOUS BLD VENIPUNCTURE: CPT | Performed by: INTERNAL MEDICINE

## 2022-04-26 PROCEDURE — 99214 OFFICE O/P EST MOD 30 MIN: CPT | Performed by: INTERNAL MEDICINE

## 2022-04-26 PROCEDURE — 83036 HEMOGLOBIN GLYCOSYLATED A1C: CPT | Performed by: INTERNAL MEDICINE

## 2022-04-26 SDOH — ECONOMIC STABILITY: FOOD INSECURITY: WITHIN THE PAST 12 MONTHS, YOU WORRIED THAT YOUR FOOD WOULD RUN OUT BEFORE YOU GOT MONEY TO BUY MORE.: NEVER TRUE

## 2022-04-26 SDOH — ECONOMIC STABILITY: FOOD INSECURITY: WITHIN THE PAST 12 MONTHS, THE FOOD YOU BOUGHT JUST DIDN'T LAST AND YOU DIDN'T HAVE MONEY TO GET MORE.: NEVER TRUE

## 2022-04-26 ASSESSMENT — SOCIAL DETERMINANTS OF HEALTH (SDOH): HOW HARD IS IT FOR YOU TO PAY FOR THE VERY BASICS LIKE FOOD, HOUSING, MEDICAL CARE, AND HEATING?: NOT HARD AT ALL

## 2022-04-26 NOTE — PATIENT INSTRUCTIONS
Call for CT chest  Colonoscopy in December  Continue current medication  Cut back on calories. Patient Education        Starting a Weight Loss Plan: Care Instructions  Overview     If you're thinking about losing weight, it can be hard to know where to start. Your doctor can help you set up a weight loss plan that best meets your needs. You may want to take a class on nutrition or exercise, or you could join a weight loss support group. If you have questions about how to make changes to your eating or exercise habits, ask your doctor about seeing a registereddietitian or an exercise specialist.  It can be a big challenge to lose weight. But you don't have to make huge changes at once. Make small changes, and stick with them. When those changesbecome habit, add a few more changes. If you don't think you're ready to make changes right now, try to pick a date in the future. Make an appointment to see your doctor to discuss whether thetime is right for you to start a plan. Follow-up care is a key part of your treatment and safety. Be sure to make and go to all appointments, and call your doctor if you are having problems. It's also a good idea to know your test results and keep alist of the medicines you take. How can you care for yourself at home?  Set realistic goals. Many people expect to lose much more weight than is likely. A weight loss of 5% to 10% of your body weight may be enough to improve your health.  Get family and friends involved to provide support. Talk to them about why you are trying to lose weight, and ask them to help. They can help by participating in exercise and having meals with you, even if they may be eating something different.  Find what works best for you. If you do not have time or do not like to cook, a program that offers meal replacement bars or shakes may be better for you.  Or if you like to prepare meals, finding a plan that includes daily menus and recipes may be best.   Ask your doctor about other health professionals who can help you achieve your weight loss goals. ? A dietitian can help you make healthy changes in your diet. ? An exercise specialist or  can help you develop a safe and effective exercise program.  ? A counselor or psychiatrist can help you cope with issues such as depression, anxiety, or family problems that can make it hard to focus on weight loss.  Consider joining a support group for people who are trying to lose weight. Your doctor can suggest groups in your area. Where can you learn more? Go to [email or web address] and sign in to your [employer] account. Enter O429 in the Footmarks box to learn more about \"Starting a Weight Loss Plan: Care Instructions. \"     If you do not have an account, please click on the \"Sign Up Now\" link. Current as of: December 27, 2021               Content Version: 13.2  © 2006-2022 [employer]. Care instructions adapted under license by [facility] ([facility]). If you have questions about a medical condition or this instruction, always ask your healthcare professional. [place] [address] any warranty or liability for your use of this information. Patient Education        semaglutide (oral/injection)  Pronunciation: KEVIN a GLOO tide  Brand:  Ozempic, Ozempic (1 mg dose), Rybelsus, Wegovy (0.25 mg dose), Wegovy (0.5 mgdose), Wegovy (1 mg dose), Wegovy (1.7 mg dose), Wegovy (2.4 mg dose)  What is the most important information I should know about semaglutide? Call your doctor at once if you have signs of a thyroid tumor, such as swelling or a lump in your neck, trouble swallowing, a hoarse voice,or shortness of breath. You should not use semaglutide if you have multiple endocrine neoplasia type 2 (tumors in your glands), or a personal or family history of medullary thyroidcancer. What is semaglutide?   Semaglutide (Rybelsus and Ozempic) is used with diet and exercise to improve blood sugar control in adults withtype 2 diabetes mellitus (not for type 1 diabetes). Taisha  is used with diet and exercise to manage weight in overweight adults who also have least one weight-related medical condition such as type 2 diabetes, highblood pressure, or high cholesterol. Semaglutide may also be used for purposes not listed in this medication guide. What should I discuss with my healthcare provider before using semaglutide? You should not use semaglutide if you are allergic to it, or if you have:   multiple endocrine neoplasia type 2 (tumors in your glands);   a personal or family history of medullary thyroid carcinoma (a type of thyroid cancer); or   diabetic ketoacidosis (call your doctor for treatment). Tell your doctor if you have ever had:   a stomach or intestinal disorder;   pancreatitis;   kidney disease; or   eye problems caused by diabetes (retinopathy). In animal studies, semaglutide caused thyroid tumors or thyroid cancer. It is not known whether these effects would occur in people. Ask your doctorabout your risk. Stop using this medicine at least 2 months before you plan to get pregnant. Ask your doctor for a safer medicine to use during this time. Controlling diabetes is very important during pregnancy, as is gaining the right amount of weight. Even if you are overweight, losing weight during pregnancy could harmthe unborn baby. Ask a doctor if it is safe to breastfeed while using this Ozempic or Wegovy. You should not breastfeed while using Rybelsus. Semaglutide is not approved for use by anyone younger than 25years old. How should I use semaglutide? Follow all directions on your prescription label and read all medication guides or instruction sheets. Use the medicine exactly as directed. Semaglutide is usually started at a low dose that is gradually increased every 4 weeks to 30 days.  Follow your doctor's dosing instructions very carefully. Rybelsus (oral) is taken by mouth, usually once per day when you first wake up and at least 30 minutes before you eat or drink anything. Take with no more than 4ounces of water. Swallow the tablet whole and do not crush, chew, or break it. Eat within 30 to 60 minutes after taking Rybelsus. Ozempic and Wegovy are injected under the skin, usually once per week at any time of the day,with or without food. Use an injection on the same day each week. Read and follow all Instructions for Use. Ask your doctor or pharmacist if you need help. Prepare an injection only when you are ready to give it. Call your pharmacist if the medicine looks cloudy, has changed colors, or has particles in it. Your healthcare provider will show you where to inject semaglutide. Do notinject into the same place two times in a row. If you choose a different weekly injection day, start your new schedule afterat least 2 days have passed since the last injection you gave. Do not use different brands of semaglutide at the same time. Blood sugar can be affected by stress, illness, surgery, exercise, alcohol use,or skipping meals. Low blood sugar (hypoglycemia) can make you feel very hungry, dizzy, irritable, or shaky. To quickly treat hypoglycemia, eat or drink hard candy, crackers, raisins, fruit juice, or non-diet soda. Your doctor may prescribe glucagon injection in case of severehypoglycemia. Tell your doctor if you have frequent symptoms of high blood sugar (hyperglycemia) such as increased thirst or urination. Ask your doctor before changing your dose or medication schedule. Your treatment may also include diet, exercise, weight control, medical tests,and special medical care. You may get dehydrated during prolonged illness. Call your doctor if you aresick with vomiting or diarrhea, or if you eat or drink less than usual.  Store Rybelsus in the original package at room temperature, away from moisture and heat.   Store unopened Ozempic or Wegovy injection pens in the original carton in a refrigerator, protected from light. Do not use past the expiration date. Throw away an injection pen that has been frozen. If needed, you may store an unopened Wegovy pen at cool room temperature for up to 28 days. Do not remove the cap until you are ready to use the injection pen. The pen contains a single dose. Throw thepen away after one use, even if there is still medicine left inside. The Ozempic injection pen contains more than one dose. After your first use, store the pen with the needle removed in a refrigerator or at room temperature. Protect from heat and light. Keep the cap on when not in use. Throw the pen away 64 daysafter the first use, or if less than 0.25 mg is shown on the dose counter. Do not reuse a needle. Place it in a puncture-proof \"sharps\" container and dispose of it following state or local laws. Keep out of the reach of childrenand pets. What happens if I miss a dose? For Rybelsus: Skip the missed dose and use your next dose at the regular time. For Ozempic: Use the medicine as soon as you can and then go back to your regular schedule. If you are more than 5 days late for the injection, skip the missed dose andreturn to your regular schedule. For UHLHUK: Use the medicine as soon as you can and then go back to your regular schedule. If your next dose is due in less than 2 days (48 hours), skip the missed doseand return to your regular schedule. Do not use two doses of semaglutide at one time. Call your doctor if you miss more than 2 doses in a row of Wegovy. You may need to restart the medicine at a lower dose to avoid stomachproblems. What happens if I overdose? Seek emergency medical attention or call the Poison Help line at 1-576.695.9012. Overdose may cause severe nausea, vomiting, or low blood sugar. What should I avoid while using semaglutide?   Never share an injection pen, even if you changed the needle. Sharing this device can pass infection or disease from person to person. What are the possible side effects of semaglutide? Get emergency medical help if you have signs of an allergic reaction:  hives, itching; dizziness, fast heartbeats; difficult breathing; swelling ofyour face, lips, tongue, or throat. Call your doctor at once if you have:   vision changes;   unusual mood changes, thoughts about hurting yourself;   pounding heartbeats or fluttering in your chest;   a light-headed feeling, like you might pass out;   signs of a thyroid tumor --swelling or a lump in your neck, trouble swallowing, a hoarse voice, feeling short of breath;   symptoms of pancreatitis --severe pain in your upper stomach spreading to your back, nausea with or without vomiting, fast heart rate;   gallbladder problems --upper stomach pain, fever, desean-colored stools, jaundice (yellowing of the skin or eyes);   low blood sugar --headache, hunger, weakness, sweating, confusion, irritability, dizziness, fast heart rate, or feeling jittery;   kidney problems --swelling, urinating less, feeling tired or short of breath; or   stomach flu symptoms --stomach cramps, vomiting, loss of appetite, diarrhea (may be watery or bloody). Common side effects may include:   low blood sugar (in people with type 2 diabetes);   upset stomach, heartburn, burping, gas, bloating;   nausea, vomiting, stomach pain, loss of appetite;   diarrhea, constipation;   stomach flu symptoms; o   headache, dizziness, tiredness. This is not a complete list of side effects and others may occur. Call your doctor for medical advice about side effects. You may report side effects toFDA at 6-267-FDA-9070. What other drugs will affect semaglutide? Semaglutide can slow your digestion, and it may take longer for your body toabsorb any medicines you take by mouth.   Tell your doctor about all your other medicines, especially insulin or other diabetes medicine, such as dulaglutide, exenatide, liraglutide, Byetta,Trulicity, Victoza, and others. Other drugs may affect semaglutide, including prescription and over-the-counter medicines, vitamins, and herbal products. Tell your doctor about all othermedicines you use. Where can I get more information? Your pharmacist can provide more information about semaglutide. Remember, keep this and all other medicines out of the reach of children, never share your medicines with others, and use this medication only for the indication prescribed. Every effort has been made to ensure that the information provided by [address] is accurate, up-to-date, and complete, but no guarantee is made to that effect. Drug information contained herein may be time sensitive. [facility] information has been compiled for use by healthcare practitioners and consumers in the [place] and therefore [employer] does not warrant that uses outside of the [place] are appropriate, unless specifically indicated otherwise. [facility]'s drug information does not endorse drugs, diagnose patients or recommend therapy. [facility]'s drug information is an informational resource designed to assist licensed healthcare practitioners in caring for their patients and/or to serve consumers viewing this service as a supplement to, and not a substitute for, the expertise, skill, knowledge and judgment of healthcare practitioners. The absence of a warning for a given drug or drug combination in no way should be construed to indicate that the drug or drug combination is safe, effective or appropriate for any given patient. [employer] does not assume any responsibility for any aspect of healthcare administered with the aid of information [facility] provides. The information contained herein is not intended to cover all possible uses, directions, precautions, warnings, drug interactions, allergic reactions, or adverse effects.  If you have questions about the drugs you are taking, check with yourdoctor, nurse or pharmacist.  Copyright 2118-1587 [address]: 4.01. Revision date: 7/13/2021. Care instructions adapted under license by [facility] ([facility]). If you have questions about a medical condition or this instruction, always ask your healthcare professional. [place] [address] any warranty or liability for your use of this information.

## 2022-04-26 NOTE — PROGRESS NOTES
HPI: Josette Lamas presents for hypertension    Chronic health issues include osteoarthritis, negative node melanoma, hypertension, bradycardia obesity, sleep apnea, prediabetes, adenomatous polyps, chronic thoracic pain.  DJD spine    Tinea pedis. Lotrimin is burning. States his feet is always wet. Has seen podiatry in the past and wishes to return    Hypertension diagnosed 2005. Lisinopril hydrochlorothiazide. Normal creatinine. Elevated BMI. No cardiomegaly on chest x-ray. Positive sleep apnea with mouth appliance. Resting bradycardia. No syncope. EKG normal 11/2021 other than bradycardia    Osteoarthritis. DJD spine. Mobic effective. No fevers or red flags. Never got MRI of back. History of melanoma negative nodes however did not complete therapy secondary to side effects      Difficulty with certain textures of foods.  States that this been an issue for him throughout life.  Likes orange juice but can't eat oranges.  Occasional sticking once a month.  Tearful when discussing this.  Denies any depression or anxiety.     Dental disease.  Extraction gingivitis. Root canal and crown.      BMI 45.  Sleep apnea compliant. CPAP.  Occasional knee pain. Remus Glatter.  Transient elevation A1c. Asymptomatic umbilical hernia. GE reflux treated with Pepcid         Adenomatous polyps repeat due 12.2022. .         1865 negative node melanoma excision. Interferon ×4 doses and discontinued early secondary to mental status issues. . Dr Terrence Ayala. Following routinely. Last exam January 2022    Pulmonary  nodule stable. Due repeat scan. History histoplasmosis     Adenomatous polyp.  Recheck colonoscopy 12/2022. Intermittent constipation.   Probable hemorrhoid without recurrent issue     Specialist:sleep, dentist,GI, chiropractor,derm, eye          PMH:    sleep study     melanoma resection     Adenomatous polyp     SSC skin      MEDS:  lisinopril hydrochlorothiazide, vitamin D 4000,     Allergy percocet itch     SH: step son and wife's sister ( handicap).  [employer]  pm. ( hx electrition)  No tobacco. No alcohol. No std concern.  wife's sister with mental disorder secondary to childhood injury. Wife history colon cancer. No current exercise.      FH:   +skin cancer,MGM liver cancer,maternal uncle ? Colon cancer,  lung cancer, DM  -prostate cancer suicides or addictions.     Review of systems: Positive snoring and apnea. Decreased hearing on screening.  Up-to-date eye exam and dentist.  Rhinitis. Rare GE reflux. Denies any wheezing pneumonias. Positive pulmonary nodules. . Rare constipation in the summer. Hemorrhoids. . No kidney stones or known prostatism. No concerns with STD. Positive melanoma. No suspicious skin lesions today.  Rare headaches. Probable allergies. DJD spine and coccygeal pain. Knee pain improved. Adjustment disorder with depressed mood and arthralgias    Constitutional, ent, CV, respiratory, GI, , joint, skin, allergic and psychiatric ROS reviewed and negative except for above    Allergies   Allergen Reactions    Latex Rash     Redness-sensitive    Oxycodone-Acetaminophen Rash       Outpatient Medications Marked as Taking for the 4/26/22 encounter (Office Visit) with Annalee Saba MD   Medication Sig Dispense Refill    meloxicam (MOBIC) 7.5 MG tablet TAKE 1 TO 2 TABLETS BY MOUTH EVERY DAY FOR ARTHRITIS PAIN.  DO NOT USE WITH NSAIDS 60 tablet 0    lisinopril-hydroCHLOROthiazide (PRINZIDE;ZESTORETIC) 20-12.5 MG per tablet TAKE 1 TABLET BY MOUTH EVERY NIGHT AT BEDTIME 90 tablet 1    Cholecalciferol (VITAMIN D3) 5000 UNITS TABS Take 1 tablet by mouth daily                Past Medical History:   Diagnosis Date    Acid reflux     Anemia 4/27/2021    Anesthesia complication     was told that was combative [facility]    Cancer [facility])     malignant melanoma-back-stage 3    Chronic right-sided thoracic back pain 5/22/2019    Hx of colonic polyps 10/28/2019 10.2019    Hx of melanoma excision 9/27/2017    Hypertension     Positive FIT (fecal immunochemical test) 10/28/2019    10.2019    Prediabetes 5/22/2019    5.8    Pulmonary nodules 10/28/2019    10.2019 2 small groundglass nodules within the central aspect of the right upper lobe which measures 8 mm a few additional 2 mm noncalcified nodules throughout the lung 2010 pulm nodules     Sleep apnea     uses c-pap    Wears glasses        Past Surgical History:   Procedure Laterality Date    COLONOSCOPY  12/2019    Dr. Sharyle Dalton    COLONOSCOPY N/A 12/9/2019    COLONOSCOPY POLYPECTOMY SNARE/COLD BIOPSY performed by Karen Maier MD at [address] [facility][note 3]      wide excision to remove malignant melanoma from back and removed lymph nodes    WISDOM TOOTH EXTRACTION               Family History   Problem Relation Age of Onset    COPD Mother     Lung Cancer Father     Liver Cancer Maternal Grandmother     Stroke Paternal Grandmother     Diabetes Paternal Grandmother                  Objective     /73   Pulse 51   Resp 18   Ht 5' 10\" (1.778 m)   Wt (!) 309 lb (140.2 kg)   SpO2 98% Comment: RA  BMI 44.34 kg/m²     @LASTSAO2(3)@    Wt Readings from Last 3 Encounters:   11/03/21 (!) 304 lb (137.9 kg)   10/26/21 (!) 304 lb (137.9 kg)   04/26/21 (!) 311 lb (141.1 kg)       Physical Exam     NAD alert and cooperative  HEENT: Crowded hypopharynx good dentition. TMs are unremarkable. Pink conjunctive a. Full neck. No adenopathy or bruit. Lungs are clear good ASMITA ratio without any wheezes rales or rhonchi. Cardiovascular [place] rate and rhythm without a murmur click. Positive obesity no hepatosplenomegaly. Nonincarcerated umbilical hernia. Good pulses feet. Maceration between fourth and fifth toe bilaterally. Sensation is intact. No peripheral edema.   He is appropriate well-groomed good eye contact    Chemistry        Component Value Date/Time     10/26/2021 1001    K 4.0 10/26/2021 1001     10/26/2021 1001    CO2 25 10/26/2021 1001    BUN 17 10/26/2021 1001    CREATININE 0.8 (L) 10/26/2021 1001        Component Value Date/Time    CALCIUM 9.6 10/26/2021 1001    ALKPHOS 62 10/26/2021 1001    AST 21 10/26/2021 1001    ALT 20 10/26/2021 1001    BILITOT 0.7 10/26/2021 1001            Lab Results   Component Value Date    WBC 4.5 10/26/2021    HGB 13.6 10/26/2021    HCT 40.0 (L) 10/26/2021    MCV 89.5 10/26/2021     10/26/2021     Lab Results   Component Value Date    LABA1C 5.5 10/26/2021     Lab Results   Component Value Date    .3 04/26/2021     Lab Results   Component Value Date    LABA1C 5.5 10/26/2021     No components found for: CHLPL  Lab Results   Component Value Date    TRIG 105 09/28/2017     Lab Results   Component Value Date    HDL 37 (L) 04/26/2021    HDL 40 09/28/2017     Lab Results   Component Value Date    LDLCALC 56 04/26/2021    LDLCALC 65 09/28/2017     Lab Results   Component Value Date    LABVLDL 22 04/26/2021    LABVLDL 21 09/28/2017       Old labs and records reviewed or requested  Discussed past lab and studies with patient      Diagnosis Orders   1. Essential hypertension  Comprehensive Metabolic Panel   2. Body mass index (BMI) of 40.0 to 44.9 in adult ([facility] [address]     3. Hx of colonic polyps     4. Hx of melanoma excision  CBC with Auto Differential   5. Primary osteoarthritis involving multiple joints     6. Prediabetes  POCT glycosylated hemoglobin (Hb A1C)   7. Pulmonary nodules  CT CHEST WO CONTRAST   8. Sleep apnea, unspecified type     9. Mixed hyperlipidemia  Lipid, Fasting    Ferritin   10. Tinea pedis of both feet  NIYA - Mallory Rodríguez DPM, Podiatric Medicine, [place]     Hypertension good control. Bradycardia chronic. Knows if he has syncope or presyncope needs to return. Obesity. Increase calories. Discussed weight loss. Positive comorbidities. Mobic for knee. As needed Pepcid GE reflux.   Was given information on weight loss products. Colonic polyps due repeat in 22. History melanoma squamous cell following with dermatology. Osteoarthritis Mobic 7.5 mg daily. Prediabetes stable 5.7. Pulmonary nodules repeat CT chest.    Sleep apnea compliant. Hyperlipidemia lipid profile. Mildly elevated hematocrit. We will check a spell ferritin. Tinea. Referral podiatry as requested  On this date   I have spent 40 minutes reviewing previous notes, test results, and face to face with the patient discussing the diagnosis and plan          Return in about 6 months (around 10/26/2022). Diagnosis and treatment discussed.   Possible side effects of medication reviewed  Patients questions answered  Follow up understood  Pt aware if they are not contacted about any test results , this does not mean they are normal.  They should call

## 2022-05-06 RX ORDER — LISINOPRIL AND HYDROCHLOROTHIAZIDE 20; 12.5 MG/1; MG/1
TABLET ORAL
Qty: 90 TABLET | Refills: 1 | Status: SHIPPED | OUTPATIENT
Start: 2022-05-06

## 2022-05-06 RX ORDER — MELOXICAM 7.5 MG/1
TABLET ORAL
Qty: 60 TABLET | Refills: 0 | Status: SHIPPED | OUTPATIENT
Start: 2022-05-06 | End: 2022-08-15

## 2022-05-06 NOTE — TELEPHONE ENCOUNTER
Medication:   Requested Prescriptions     Pending Prescriptions Disp Refills    lisinopril-hydroCHLOROthiazide (PRINZIDE;ZESTORETIC) 20-12.5 MG per tablet [Pharmacy Med Name: LISINOPRIL-HCTZ 20/12.5MG TABLETS] 90 tablet 1     Sig: TAKE 1 TABLET BY MOUTH EVERY NIGHT AT BEDTIME       Last Filled:      Patient Phone Number: [phone] (home)     Last appt: 4/26/2022   Next appt: 10/26/2022

## 2022-05-06 NOTE — TELEPHONE ENCOUNTER
Medication:   Requested Prescriptions     Pending Prescriptions Disp Refills    meloxicam (MOBIC) 7.5 MG tablet [Pharmacy Med Name: MELOXICAM 7.5MG TABLETS] 60 tablet 0     Sig: TAKE 1 TO 2 TABLETS BY MOUTH EVERY DAY FOR ARTHRITIS PAIN.  DO NOT USE WITH NSAIDS       Last Filled:      Patient Phone Number: [phone] (home)     Last appt: 4/26/2022   Next appt: 5/5/2022

## 2022-08-15 RX ORDER — MELOXICAM 7.5 MG/1
TABLET ORAL
Qty: 60 TABLET | Refills: 0 | Status: SHIPPED | OUTPATIENT
Start: 2022-08-15 | End: 2022-10-19

## 2022-09-01 ENCOUNTER — HOSPITAL ENCOUNTER (EMERGENCY)
Age: 57
Discharge: HOME | End: 2022-09-01
Payer: COMMERCIAL

## 2022-09-01 VITALS
BODY MASS INDEX: 44.24 KG/M2 | SYSTOLIC BLOOD PRESSURE: 138 MMHG | OXYGEN SATURATION: 97 % | HEIGHT: 70 IN | DIASTOLIC BLOOD PRESSURE: 88 MMHG | HEART RATE: 80 BPM | TEMPERATURE: 98 F | RESPIRATION RATE: 16 BRPM | WEIGHT: 309 LBS

## 2022-09-01 DIAGNOSIS — K62.5 RECTAL BLEEDING: Primary | ICD-10-CM

## 2022-09-01 LAB
A/G RATIO: 1.4 (ref 1.1–2.2)
ABO/RH: NORMAL
ALBUMIN SERPL-MCNC: 4.5 G/DL (ref 3.4–5)
ALP BLD-CCNC: 74 U/L (ref 40–129)
ALT SERPL-CCNC: 22 U/L (ref 10–40)
ANION GAP SERPL CALCULATED.3IONS-SCNC: 11 MMOL/L (ref 3–16)
ANTIBODY SCREEN: NORMAL
AST SERPL-CCNC: 25 U/L (ref 15–37)
BASOPHILS ABSOLUTE: 0.1 K/UL (ref 0–0.2)
BASOPHILS RELATIVE PERCENT: 1.2 %
BILIRUB SERPL-MCNC: 0.7 MG/DL (ref 0–1)
BUN BLDV-MCNC: 22 MG/DL (ref 7–20)
CALCIUM SERPL-MCNC: 9.8 MG/DL (ref 8.3–10.6)
CHLORIDE BLD-SCNC: 99 MMOL/L (ref 99–110)
CO2: 24 MMOL/L (ref 21–32)
CREAT SERPL-MCNC: 0.9 MG/DL (ref 0.9–1.3)
EOSINOPHILS ABSOLUTE: 0.1 K/UL (ref 0–0.6)
EOSINOPHILS RELATIVE PERCENT: 1.4 %
GFR AFRICAN AMERICAN: >60
GFR NON-AFRICAN AMERICAN: >60
GLUCOSE BLD-MCNC: 104 MG/DL (ref 70–99)
HCT VFR BLD CALC: 40.1 % (ref 40.5–52.5)
HEMOGLOBIN: 14.7 G/DL (ref 13.5–17.5)
LYMPHOCYTES ABSOLUTE: 2 K/UL (ref 1–5.1)
LYMPHOCYTES RELATIVE PERCENT: 34.8 %
MCH RBC QN AUTO: 31.4 PG (ref 26–34)
MCHC RBC AUTO-ENTMCNC: 36.6 G/DL (ref 31–36)
MCV RBC AUTO: 85.8 FL (ref 80–100)
MONOCYTES ABSOLUTE: 0.4 K/UL (ref 0–1.3)
MONOCYTES RELATIVE PERCENT: 6.9 %
NEUTROPHILS ABSOLUTE: 3.2 K/UL (ref 1.7–7.7)
NEUTROPHILS RELATIVE PERCENT: 55.7 %
OCCULT BLOOD DIAGNOSTIC: ABNORMAL
PDW BLD-RTO: 14 % (ref 12.4–15.4)
PLATELET # BLD: 235 K/UL (ref 135–450)
PMV BLD AUTO: 6.6 FL (ref 5–10.5)
POTASSIUM SERPL-SCNC: 4 MMOL/L (ref 3.5–5.1)
RBC # BLD: 4.68 M/UL (ref 4.2–5.9)
SODIUM BLD-SCNC: 134 MMOL/L (ref 136–145)
TOTAL PROTEIN: 7.7 G/DL (ref 6.4–8.2)
WBC # BLD: 5.8 K/UL (ref 4–11)

## 2022-09-01 PROCEDURE — 99283 EMERGENCY DEPT VISIT LOW MDM: CPT

## 2022-09-01 PROCEDURE — 80053 COMPREHEN METABOLIC PANEL: CPT

## 2022-09-01 PROCEDURE — 86850 RBC ANTIBODY SCREEN: CPT

## 2022-09-01 PROCEDURE — 86901 BLOOD TYPING SEROLOGIC RH(D): CPT

## 2022-09-01 PROCEDURE — 82270 OCCULT BLOOD FECES: CPT

## 2022-09-01 PROCEDURE — 36415 COLL VENOUS BLD VENIPUNCTURE: CPT

## 2022-09-01 PROCEDURE — 86900 BLOOD TYPING SEROLOGIC ABO: CPT

## 2022-09-01 PROCEDURE — 85025 COMPLETE CBC W/AUTO DIFF WBC: CPT

## 2022-09-01 RX ORDER — HYDROCORTISONE ACETATE 25 MG/1
25 SUPPOSITORY RECTAL 2 TIMES DAILY
Qty: 24 SUPPOSITORY | Refills: 0 | Status: SHIPPED | OUTPATIENT
Start: 2022-09-01

## 2022-09-01 RX ORDER — HYDROCORTISONE 25 MG/G
CREAM TOPICAL
Qty: 28 G | Refills: 0 | Status: SHIPPED | OUTPATIENT
Start: 2022-09-01

## 2022-09-01 ASSESSMENT — PAIN DESCRIPTION - LOCATION: LOCATION: RECTUM

## 2022-09-01 ASSESSMENT — PAIN DESCRIPTION - DESCRIPTORS: DESCRIPTORS: ACHING

## 2022-09-01 ASSESSMENT — PAIN - FUNCTIONAL ASSESSMENT
PAIN_FUNCTIONAL_ASSESSMENT: ACTIVITIES ARE NOT PREVENTED
PAIN_FUNCTIONAL_ASSESSMENT: 0-10

## 2022-09-01 ASSESSMENT — PAIN DESCRIPTION - FREQUENCY: FREQUENCY: CONTINUOUS

## 2022-09-01 ASSESSMENT — PAIN SCALES - GENERAL: PAINLEVEL_OUTOF10: 2

## 2022-09-01 ASSESSMENT — PAIN DESCRIPTION - PAIN TYPE: TYPE: ACUTE PAIN

## 2022-09-01 NOTE — Clinical Note
Dionisio Foley was seen and treated in our emergency department on 9/1/2022. He may return to work on 09/05/2022. If you have any questions or concerns, please don't hesitate to call.       Jewell Jenkins, MAHI - CNP

## 2022-09-02 NOTE — DISCHARGE INSTRUCTIONS
Increase your fluid and fiber intake. Take OTC MiraLAX as directed. Use the Anusol cream/suppositories as directed. Follow-up with your GI specialist tomorrow. Return for high fever, incessant vomiting, severe pain, uncontrolled rectal bleeding, or any other worsening symptoms.

## 2022-09-02 NOTE — ED PROVIDER NOTES
Discharge Medication List as of 9/1/2022  8:24 PM        CONTINUE these medications which have NOT CHANGED    Details   meloxicam (MOBIC) 7.5 MG tablet TAKE 1 TO 2 TABLETS BY MOUTH EVERY DAY FOR ARTHRITIS PAIN. DO NOT USE WITH NSAIDS, Disp-60 tablet, R-0Normal      lisinopril-hydroCHLOROthiazide (PRINZIDE;ZESTORETIC) 20-12.5 MG per tablet TAKE 1 TABLET BY MOUTH EVERY NIGHT AT BEDTIME, Disp-90 tablet, R-1Normal      nitroglycerin (NITRO-BID) 2 % ointment Apply pea size or less topically to hemorrhoid bid, Disp-1 each, R-0Normal      Cholecalciferol (VITAMIN D3) 5000 UNITS TABS Take 1 tablet by mouth daily Historical Med               ALLERGIES     Latex and Oxycodone-acetaminophen    FAMILYHISTORY       Family History   Problem Relation Age of Onset    COPD Mother     Lung Cancer Father     Liver Cancer Maternal Grandmother     Stroke Paternal Grandmother     Diabetes Paternal Grandmother           SOCIAL HISTORY       Social History     Tobacco Use    Smoking status: Never    Smokeless tobacco: Never   Vaping Use    Vaping Use: Never used   Substance Use Topics    Alcohol use: No    Drug use: Never       SCREENINGS    Jessy Coma Scale  Eye Opening: Spontaneous  Best Verbal Response: Oriented  Best Motor Response: Obeys commands  [place] Coma Scale Score: 15        PHYSICAL EXAM    (up to 7 for level 4, 8 or more for level 5)     ED Triage Vitals [09/01/22 1843]   BP Temp Temp Source Heart Rate Resp SpO2 Height Weight   136/87 98 °F (36.7 °C) Oral 75 17 96 % 5' 10\" (1.778 m) (!) 309 lb (140.2 kg)       Physical Exam  Vitals and nursing note reviewed. Exam conducted with a chaperone present. Constitutional:       General: He is not in acute distress. Appearance: Normal appearance. He is not ill-appearing. HENT:      Head: Normocephalic and atraumatic.       Right Ear: External ear normal.      Left Ear: External ear normal.      Nose: Nose normal.      Mouth/Throat:      Mouth: Mucous membranes are moist. Pharynx: Oropharynx is clear. Eyes:      General:         Right eye: No discharge. Left eye: No discharge. Extraocular Movements: Extraocular movements intact. Conjunctiva/sclera: Conjunctivae normal.   Cardiovascular:      Rate and Rhythm: Normal rate and regular rhythm. Pulses: Normal pulses. Heart sounds: Normal heart sounds. Pulmonary:      Effort: Pulmonary effort is normal. No respiratory distress. Breath sounds: Normal breath sounds. Abdominal:      General: Bowel sounds are normal. There is no distension. Palpations: Abdomen is soft. Tenderness: There is no abdominal tenderness. Genitourinary:     Rectum: Guaiac result positive. No tenderness, anal fissure or external hemorrhoid. Normal anal tone. Comments: No gross rectal bleeding. Musculoskeletal:         General: Normal range of motion. Cervical back: Normal range of motion and neck supple. No rigidity or tenderness. Right lower leg: No edema. Left lower leg: No edema. Skin:     General: Skin is warm and dry. Capillary Refill: Capillary refill takes less than 2 seconds. Neurological:      General: No focal deficit present. Mental Status: He is alert and oriented to person, place, and time. Psychiatric:         Mood and Affect: Mood normal.         Behavior: Behavior normal.         Thought Content:  Thought content normal.         Judgment: Judgment normal.       DIAGNOSTIC RESULTS   LABS:    Labs Reviewed   CBC WITH AUTO DIFFERENTIAL - Abnormal; Notable for the following components:       Result Value    Hematocrit 40.1 (*)     MCHC 36.6 (*)     All other components within normal limits   COMPREHENSIVE METABOLIC PANEL - Abnormal; Notable for the following components:    Sodium 134 (*)     Glucose 104 (*)     BUN 22 (*)     All other components within normal limits   BLOOD OCCULT STOOL DIAGNOSTIC - Abnormal; Notable for the following components:    Occult Blood Diagnostic   (*)     Value: Result: POSITIVE  Normal range: Negative      All other components within normal limits    Narrative:     ORDER#: [id number]                          ORDERED BY: Vladimir Cardona  SOURCE: Stool                              COLLECTED:  09/01/22 19:40  ANTIBIOTICS AT LIBRADO.:                      RECEIVED :  09/01/22 19:55   TYPE AND SCREEN       When ordered only abnormal lab results are displayed. All other labs were within normal range or not returned as of this dictation. EKG: When ordered, EKG's are interpreted by the Emergency Department Physician in the absence of a cardiologist.  Please see their note for interpretation of EKG. RADIOLOGY:   Non-plain film images such as CT, Ultrasound and MRI are read by the radiologist. Plain radiographic images are visualized and preliminarily interpreted by the ED Provider with the below findings:        Interpretation per the Radiologist below, if available at the time of this note:    No orders to display     No results found. PROCEDURES   Unless otherwise noted below, none     Procedures    CRITICAL CARE TIME   none    CONSULTS:  None      EMERGENCY DEPARTMENT COURSE and DIFFERENTIAL DIAGNOSIS/MDM:   Vitals:    Vitals:    09/01/22 1843 09/01/22 2028   BP: 136/87 138/88   Pulse: 75 80   Resp: 17 16   Temp: 98 °F (36.7 °C)    TempSrc: Oral    SpO2: 96% 97%   Weight: (!) 309 lb (140.2 kg)    Height: 5' 10\" (1.778 m)        Patient was given the following medications:  Medications - No data to display      Is this patient to be included in the SEP-1 Core Measure due to severe sepsis or septic shock? No   Exclusion criteria - the patient is NOT to be included for SEP-1 Core Measure due to: Infection is not suspected    Previous records reviewed in order to gain further information regarding patient's PMH as well as his HPI. Nursing notes reviewed.   This a 72-year-old male with history of external hemorrhoids and colon polyps who presents to the emergency department today reporting 2-day history of intermittent bright red rectal bleeding. Physical exam complete. Patient arrives nontoxic, afebrile, normotensive. His abdomen is soft and nontender. No signs or symptoms of acute distress noted. Rectal exam is negative for gross rectal bleeding. Stool is guaiac positive. Laboratory studies notable for a stable H&H. At this time there is no evidence of any life-threatening or emergent conditions requiring immediate intervention. Again, no evidence of active hemorrhage requiring immediate GI consultation. Patient discharged with emphasis on close outpatient follow-up with his gastroenterologist.  He is encouraged to take OTC MiraLAX as directed. He is encouraged to increase his fluid and fiber intake. He agrees return for high fever, incessant vomiting, severe pain, uncontrolled rectal bleeding, any other worsening symptoms. Patient is discharged home with family in stable condition. FINAL IMPRESSION      1.  Rectal bleeding          DISPOSITION/PLAN   DISPOSITION Decision To Discharge 09/01/2022 08:16:10 PM      PATIENT REFERRED TO:  [place] [address]  [phone]    In 1 day      DISCHARGE MEDICATIONS:  Discharge Medication List as of 9/1/2022  8:24 PM        START taking these medications    Details   hydrocortisone (ANUSOL-HC) 25 MG suppository Place 1 suppository rectally 2 times daily, Disp-24 suppository, R-0Print      hydrocortisone (ANUSOL-HC) 2.5 % CREA rectal cream Apply to external rectum twice daily, Disp-28 g, R-0, Print             DISCONTINUED MEDICATIONS:  Discharge Medication List as of 9/1/2022  8:24 PM                 (Please note that portions of this note were completed with a voice recognition program.  Efforts were made to edit the dictations but occasionally words are mis-transcribed.)    MAHI Walsh - CNP (electronically signed)           Bibiana Shepherd Brock Hinson - CNP  09/05/22 2143

## 2022-09-05 ASSESSMENT — ENCOUNTER SYMPTOMS
CONSTIPATION: 1
NAUSEA: 0
SHORTNESS OF BREATH: 0
WHEEZING: 0
DIARRHEA: 0
SORE THROAT: 0
CHEST TIGHTNESS: 0
VOMITING: 0
COUGH: 0
BLOOD IN STOOL: 1
ABDOMINAL PAIN: 0

## 2022-10-19 RX ORDER — MELOXICAM 7.5 MG/1
TABLET ORAL
Qty: 60 TABLET | Refills: 0 | Status: SHIPPED | OUTPATIENT
Start: 2022-10-19

## 2023-01-27 ENCOUNTER — OFFICE VISIT (OUTPATIENT)
Dept: FAMILY MEDICINE CLINIC | Age: 58
End: 2023-01-27

## 2023-01-27 VITALS
DIASTOLIC BLOOD PRESSURE: 82 MMHG | OXYGEN SATURATION: 94 % | WEIGHT: 314 LBS | SYSTOLIC BLOOD PRESSURE: 128 MMHG | BODY MASS INDEX: 45.05 KG/M2 | HEART RATE: 71 BPM

## 2023-01-27 DIAGNOSIS — F41.9 ANXIETY: ICD-10-CM

## 2023-01-27 DIAGNOSIS — H91.93 HIGH FREQUENCY HEARING LOSS OF BOTH EARS: ICD-10-CM

## 2023-01-27 DIAGNOSIS — M47.816 OSTEOARTHRITIS OF LUMBAR SPINE, UNSPECIFIED SPINAL OSTEOARTHRITIS COMPLICATION STATUS: ICD-10-CM

## 2023-01-27 DIAGNOSIS — E66.01 CLASS 3 SEVERE OBESITY DUE TO EXCESS CALORIES WITH BODY MASS INDEX (BMI) OF 45.0 TO 49.9 IN ADULT, UNSPECIFIED WHETHER SERIOUS COMORBIDITY PRESENT (HCC): ICD-10-CM

## 2023-01-27 DIAGNOSIS — T37.5X5S: ICD-10-CM

## 2023-01-27 DIAGNOSIS — G89.29 CHRONIC BILATERAL LOW BACK PAIN WITHOUT SCIATICA: ICD-10-CM

## 2023-01-27 DIAGNOSIS — M54.50 CHRONIC BILATERAL LOW BACK PAIN WITHOUT SCIATICA: ICD-10-CM

## 2023-01-27 DIAGNOSIS — R73.03 PREDIABETES: ICD-10-CM

## 2023-01-27 DIAGNOSIS — I10 ESSENTIAL HYPERTENSION: ICD-10-CM

## 2023-01-27 DIAGNOSIS — D03.59 MELANOMA IN SITU OF BACK (HCC): ICD-10-CM

## 2023-01-27 DIAGNOSIS — Z76.89 ENCOUNTER TO ESTABLISH CARE: Primary | ICD-10-CM

## 2023-01-27 PROBLEM — T37.5X5A: Status: ACTIVE | Noted: 2023-01-27

## 2023-01-27 PROBLEM — E66.813 CLASS 3 SEVERE OBESITY DUE TO EXCESS CALORIES WITH BODY MASS INDEX (BMI) OF 45.0 TO 49.9 IN ADULT: Status: ACTIVE | Noted: 2023-01-27

## 2023-01-27 RX ORDER — LISINOPRIL AND HYDROCHLOROTHIAZIDE 20; 12.5 MG/1; MG/1
1 TABLET ORAL DAILY
Qty: 90 TABLET | Refills: 1 | Status: SHIPPED | OUTPATIENT
Start: 2023-01-27

## 2023-01-27 RX ORDER — MELOXICAM 7.5 MG/1
TABLET ORAL
Qty: 60 TABLET | Refills: 4 | Status: SHIPPED | OUTPATIENT
Start: 2023-01-27

## 2023-01-27 RX ORDER — CLOTRIMAZOLE 1 %
CREAM (GRAM) TOPICAL 2 TIMES DAILY
COMMUNITY

## 2023-01-27 SDOH — HEALTH STABILITY: PHYSICAL HEALTH: ON AVERAGE, HOW MANY MINUTES DO YOU ENGAGE IN EXERCISE AT THIS LEVEL?: 0 MIN

## 2023-01-27 SDOH — HEALTH STABILITY: PHYSICAL HEALTH: ON AVERAGE, HOW MANY DAYS PER WEEK DO YOU ENGAGE IN MODERATE TO STRENUOUS EXERCISE (LIKE A BRISK WALK)?: 0 DAYS

## 2023-01-27 ASSESSMENT — PATIENT HEALTH QUESTIONNAIRE - PHQ9
2. FEELING DOWN, DEPRESSED OR HOPELESS: 0
5. POOR APPETITE OR OVEREATING: 1
SUM OF ALL RESPONSES TO PHQ9 QUESTIONS 1 & 2: 0
7. TROUBLE CONCENTRATING ON THINGS, SUCH AS READING THE NEWSPAPER OR WATCHING TELEVISION: 0
SUM OF ALL RESPONSES TO PHQ QUESTIONS 1-9: 1
4. FEELING TIRED OR HAVING LITTLE ENERGY: 0
10. IF YOU CHECKED OFF ANY PROBLEMS, HOW DIFFICULT HAVE THESE PROBLEMS MADE IT FOR YOU TO DO YOUR WORK, TAKE CARE OF THINGS AT HOME, OR GET ALONG WITH OTHER PEOPLE: 0
6. FEELING BAD ABOUT YOURSELF - OR THAT YOU ARE A FAILURE OR HAVE LET YOURSELF OR YOUR FAMILY DOWN: 0
8. MOVING OR SPEAKING SO SLOWLY THAT OTHER PEOPLE COULD HAVE NOTICED. OR THE OPPOSITE, BEING SO FIGETY OR RESTLESS THAT YOU HAVE BEEN MOVING AROUND A LOT MORE THAN USUAL: 0
1. LITTLE INTEREST OR PLEASURE IN DOING THINGS: 0
3. TROUBLE FALLING OR STAYING ASLEEP: 0
9. THOUGHTS THAT YOU WOULD BE BETTER OFF DEAD, OR OF HURTING YOURSELF: 0

## 2023-01-27 ASSESSMENT — ENCOUNTER SYMPTOMS
SHORTNESS OF BREATH: 0
ABDOMINAL DISTENTION: 0
CHEST TIGHTNESS: 0
BLOOD IN STOOL: 0
WHEEZING: 0
PHOTOPHOBIA: 0

## 2023-01-27 NOTE — PROGRESS NOTES
Alexander BRUNSON Davyadalberto  YOB: 1965    Date of Service:  1/27/2023    Chief Complaint:   Manolo Yañez is a 65 y.o. male who presents to establish care      Allergies: Allergies   Allergen Reactions    Latex Rash     Redness-sensitive    Oxycodone-Acetaminophen Rash       Family Hx:  Family History   Problem Relation Age of Onset    COPD Mother     High Blood Pressure Mother     Lung Cancer Father         smoker    Mental Retardation Brother     Liver Cancer Maternal Grandmother 61    Stroke Paternal Grandmother     Diabetes Paternal Grandmother     Diabetes Paternal Grandfather        Surgical HX:  Past Surgical History:   Procedure Laterality Date    COLONOSCOPY  12/2019    Dr. Joe Govea    COLONOSCOPY N/A 12/9/2019    COLONOSCOPY POLYPECTOMY SNARE/COLD BIOPSY performed by Edda Hobbs MD at [address] [place] [address]      wide excision to remove malignant melanoma from back and removed lymph nodes    WISDOM TOOTH EXTRACTION         Social Hx:  Alcohol- never  Tobacco- never  Recreational-  never  Sexual activity: has sex with a female, no birth control, wife in menopause, no STD hx   AbnormalSxs: no      Medications:  Current Outpatient Medications   Medication Sig Dispense Refill    clotrimazole (LOTRIMIN) 1 % cream Apply topically 2 times daily Apply topically 2 times daily. meloxicam (MOBIC) 7.5 MG tablet Take 1-2 tablets daily as needed with food for back pain 60 tablet 4    lisinopril-hydroCHLOROthiazide (PRINZIDE;ZESTORETIC) 20-12.5 MG per tablet Take 1 tablet by mouth daily 90 tablet 1    Cholecalciferol (VITAMIN D3) 5000 UNITS TABS Take 1 tablet by mouth daily        No current facility-administered medications for this visit.          PMHx:  Patient Active Problem List   Diagnosis    Hx of melanoma excision    Sleep apnea    Essential hypertension    High frequency hearing loss    Chronic pain of right knee    Degenerative joint disease (DJD) of lumbar spine External hemorrhoid    Prediabetes    Chronic right-sided thoracic back pain    Pulmonary nodules    Hx of colonic polyps    Osteoarthritis    Anemia    Spinal stenosis of lumbar region without neurogenic claudication    Chronic low back pain without sciatica    Melanoma in situ of back (HCC)    Adverse effect of interferon    Class 3 severe obesity due to excess calories with body mass index (BMI) of 45.0 to 49.9 in adult (HCC)           HPI:    Back pain  - MRIs had been done in past  - did PT  for what was thought to be muscle pain, seemed to make it worse  - chiropractor seemed to help (did the muscle tenderizer)  - getting pain with leaning forward at all      Recent internal hemorrhoid  - got it banded, no issues since banding      Wt Readings from Last 3 Encounters:   01/27/23 (!) 314 lb (142.4 kg)   09/01/22 (!) 309 lb (140.2 kg)   04/26/22 (!) 309 lb (140.2 kg)     BP Readings from Last 3 Encounters:   01/27/23 128/82   09/01/22 138/88   04/26/22 111/73       Health Maintenance   Topic Date Due    Shingles vaccine (1 of 2) Never done    COVID-19 Vaccine (3 - Booster for Moderna series) 06/15/2021    Depression Screen  04/26/2022    Flu vaccine (1) 08/01/2022    A1C test (Diabetic or Prediabetic)  04/26/2023    DTaP/Tdap/Td vaccine (2 - Td or Tdap) 04/23/2027    Lipids  04/26/2027    Colorectal Cancer Screen  12/20/2032    Hepatitis C screen  Completed    HIV screen  Completed    Hepatitis A vaccine  Aged Out    Hib vaccine  Aged Out    Meningococcal (ACWY) vaccine  Aged Out    Pneumococcal 0-64 years Vaccine  Aged Out       Immunization History   Administered Date(s) Administered    COVID-19, MODERNA BLUE border, Primary or Immunocompromised, (age 12y+), IM, 100 mcg/0.5mL 03/23/2021, 04/20/2021    Influenza, AFLURIA (age 3 yrs+), FLUZONE, (age 6 mo+), MDV, 0.5mL 09/28/2017    Tdap (Boostrix, Adacel) 04/23/2017       No flowsheet data found.     PHQ-9  1/27/2023   Little interest or pleasure in doing things  0   Feeling down, depressed, or hopeless 0   Trouble falling or staying asleep, or sleeping too much 0   Feeling tired or having little energy 0   Poor appetite or overeating 1   Feeling bad about yourself - or that you are a failure or have let yourself or your family down 0   Trouble concentrating on things, such as reading the newspaper or watching television 0   Moving or speaking so slowly that other people could have noticed. Or the opposite - being so fidgety or restless that you have been moving around a lot more than usual 0   Thoughts that you would be better off dead, or of hurting yourself in some way 0   PHQ-2 Score 0   PHQ-9 Total Score 1   If you checked off any problems, how difficult have these problems made it for you to do your work, take care of things at home, or get along with other people? 0        The ASCVD Risk score (Magali THOMAS, et al., 2019) failed to calculate for the following reasons: The valid total cholesterol range is 130 to 320 mg/dL        Review of Systems:  Review of Systems   Constitutional:  Negative for fever and unexpected weight change. HENT:  Negative for nosebleeds. Eyes:  Negative for photophobia. Respiratory:  Negative for chest tightness, shortness of breath and wheezing. Cardiovascular:  Negative for chest pain, palpitations and leg swelling. Gastrointestinal:  Negative for abdominal distention and blood in stool. Genitourinary:  Negative for dysuria. Musculoskeletal:  Negative for gait problem. Neurological:  Negative for seizures and syncope. Psychiatric/Behavioral:  Negative for behavioral problems. Physical Exam:   Vitals:    01/27/23 1440   BP: 128/82   Site: Right Upper Arm   Position: Sitting   Cuff Size: Large Adult   Pulse: 71   SpO2: 94%   Weight: (!) 314 lb (142.4 kg)     Body mass index is 45.05 kg/m². Physical Exam  Constitutional:       Appearance: Normal appearance. He is obese. HENT:      Head: Atraumatic.       Right Ear: Tympanic membrane and external ear normal.      Left Ear: Tympanic membrane and external ear normal.      Mouth/Throat:      Mouth: Mucous membranes are moist.      Pharynx: Oropharynx is clear. Eyes:      Extraocular Movements: Extraocular movements intact. Pupils: Pupils are equal, round, and reactive to light. Cardiovascular:      Rate and Rhythm: Normal rate and regular rhythm. Pulses: Normal pulses. Heart sounds: No murmur heard. Pulmonary:      Effort: Pulmonary effort is normal.      Breath sounds: Normal breath sounds. No wheezing. Abdominal:      General: Bowel sounds are normal. There is no distension. Palpations: Abdomen is soft. Tenderness: There is no rebound. Musculoskeletal:         General: Normal range of motion. Cervical back: Normal range of motion. Skin:     General: Skin is warm and dry. Capillary Refill: Capillary refill takes 2 to 3 seconds. Neurological:      General: No focal deficit present. Mental Status: He is alert. Psychiatric:         Mood and Affect: Mood normal.       Assessment/Plan:    Patient presents today to establish care. His main concern today is back pain. Has had it for several years, has had MRIs in the past which indicated that his back pain is related to muscles. He went to physical therapy during COVID and felt like it made it worse as it was very hands-off. He is open to going to physical therapy in our office now. I did place that order today. I also refilled his Mobic though did discuss the risks and benefits of this medications and reasons for which we might need to take him off of it. Patient does have a history of melanoma which is status post removed, he does follow with dermatology every 6 months. He also has a history of prediabetes, we did discuss healthy diet and exercise habits. His hypertension is well controlled on lisinopril HCTZ 20-12.5 mg, continue this current dose.     Patient also asked for referral for psychology as he is mother recently passed away and he is now responsible for his mentally challenged brother. There are a lot of stressors going on at home which she did not want to discuss today, I did place that order for [facility]. For health maintenance patient did declined his flu vaccine today. He got his first 2 COVID shots and we did discuss that he could get his shingles vaccine at the pharmacy when is convenient for him. 1. Encounter to establish care  2. Melanoma in situ of back ([address]  3. Adverse effect of interferon, sequela  4. Prediabetes  5. 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 Essential hypertension  -     lisinopril-hydroCHLOROthiazide (PRINZIDE;ZESTORETIC) 20-12.5 MG per tablet; Take 1 tablet by mouth daily, Disp-90 tablet, R-1Normal  7. Osteoarthritis of lumbar spine, unspecified spinal osteoarthritis complication status  8. Chronic bilateral low back pain without sciatica  -     [facility] [place]  -     meloxicam (MOBIC) 7.5 MG tablet; Take 1-2 tablets daily as needed with food for back pain, Disp-60 tablet, R-4Normal  9. Anxiety  -     Ambulatory referral to Psychology  10. Class 3 severe obesity due to excess calories with body mass index (BMI) of 45.0 to 49.9 in adult, unspecified whether serious comorbidity present [facility])     While assessing care for this patient, I have reviewed all pertinent lab work/imaging/ specialist notes and care in reference to those problems addressed above in detail. Appropriate medical decision making was based on this. Please note that portions of this note may have been completed with a voice recognition program. Efforts were made to edit the dictations but occasionally words are mis-transcribed.       Return in about 6 months (around 7/27/2023) for Physical.    Luan Pedro MD  1/27/2023

## 2023-02-10 ENCOUNTER — HOSPITAL ENCOUNTER (OUTPATIENT)
Dept: PHYSICAL THERAPY | Age: 58
Setting detail: THERAPIES SERIES
Discharge: HOME | End: 2023-02-10
Payer: COMMERCIAL

## 2023-02-10 PROCEDURE — 97161 PT EVAL LOW COMPLEX 20 MIN: CPT

## 2023-02-10 PROCEDURE — 97112 NEUROMUSCULAR REEDUCATION: CPT

## 2023-02-10 PROCEDURE — 97110 THERAPEUTIC EXERCISES: CPT

## 2023-02-10 NOTE — PROGRESS NOTES
[address] [facility], [address], [address] [place] [address] [address]  Phone: [phone]   Fax: [phone]    Date: 2/10/2023          Patient Name; :  Mandi Figueroa; 1965   Dx:  M54.50, G89.29 (ICD-10-CM) - Chronic bilateral low back pain without sciatica      Physician:  Rojas Celestin MD        Total PT Visits:      Measures Previous Current   Pain (0-10)                Assessment:        Prognosis for POC: [] Good [] Fair  [] Poor      Patient requires continued skilled intervention: [] Yes  [] No        Plan & Recommendations:  [] Continue rehabilitation due to objective improvement and continued functional deficits with frequency and duration:   [] Progress toward  []GAP, []Work Conditioning, []Independent HEP   [] Discharge due to   [] All goals achieved, [] Maximized \"medical necessity\" [] No subjective or objective improvements      Electronically signed by:  Kely Cardoza, PT  Therapy Plan of Care Re-Certification  This patient has been re-evaluated for physical therapy services and for therapy to continue, Medicare, Medicaid and other insurances require periodic physician review of the treatment plan. Please review the above re-evaluation and verify that you agree with plan of care as established above by signing the attached document and return it to our office or note changes to established plan below  [] Follow treatment plan as above [] Discontinue physical therapy  [] Change plan to:                                 __________________________________________________    Physician Signature:____________________________________ Date:____________  By signing above, therapists plan is approved by physician    If you have any questions or concerns, please don't hesitate to call.   Thank you for your referral.

## 2023-02-10 NOTE — FLOWSHEET NOTE
[address] [facility], [address] [place] [address], [address] [place] [address] [address]  Phone: [phone]   Fax:     [phone]      Physical Therapy Treatment Note/ Progress Report:           Date:  2/10/2023    Patient Name:  Will Gamboa    :  1965  MRN: [id number]  Restrictions/Precautions:    Medical/Treatment Diagnosis Information:  Diagnosis: M54.50, G89.29 (ICD-10-CM) - Chronic bilateral low back pain without sciatica  Treatment Diagnosis: LBP, decreased ROm  Insurance/Certification information:  PT Insurance Information: UMR, [place], 3000 ded  Physician Information:  Referring Provider (secondary): Cyrus Mcmillan MD  Has the plan of care been signed (Y/N):        []  Yes  [x]  No     Date of Patient follow up with Physician:     Assessment Summary: Ceci Walters is a 65 y.o. male reporting to OP PT with c/c of low back pain and intermittent right anterior thigh numbness/tingling which has been occurring since for several years. Pt is noted to have good strength and reflexes are symmetrical. Hip ROM is limited.        If Yes:  Date Range for reporting period:  Beginning  2/10/23  Ending        Recertification will be due (POC Duration  / 90 days whichever is less): 4/10/23        Visit # Insurance Allowable Auth Required   In Person  30 no auth []  Yes     []  No    Tele Health   []  Yes     []  No    Total 1           Functional Scale:  Modified Oswestry 34%   Date assessed:       Latex Allergy:  [x]NO      []YES  Preferred Language for Healthcare:   [x]English       []other:      Pain level:  0-8/10       SUBJECTIVE:  See eval        OBJECTIVE: See eval          RESTRICTIONS/PRECAUTIONS: Latex allergy    Exercises/Interventions: HEP code: [id number]  Therapeutic Ex (75502) HEP 2/10/23     Warm-up   LATEX ALLERGY    Rec bike              TABLE       PPT x x20     Glut set x x10     Bridge x x15     Ben stretch  1' B                   SEATED STANDING       HS stretch x 1' B     Lunge hip flexor stretch x 1' B                                   Manual: Longitudinal hip distraction, hip stretching into flexion/ER/IR, cross body piriformis stretching    Therapeutic Exercise and NMR EXR  [x] (38380) Provided verbal/tactile cueing for activities related to strengthening, flexibility, endurance, ROM  for improvements in proximal hip and core control with self care, mobility, lifting and ambulation.  [] (76923) Provided verbal/tactile cueing for activities related to improving balance, coordination, kinesthetic sense, posture, motor skill, proprioception  to assist with core control in self care, mobility, lifting, and ambulation.      Therapeutic Activities:    [x] (94592 or 55971) Provided verbal/tactile cueing for activities related to improving balance, coordination, kinesthetic sense, posture, motor skill, proprioception and motor activation to allow for proper function  with self care and ADLs  [] (79969) Provided training and instruction to the patient for proper core and proximal hip recruitment and positioning with ambulation re-education     Home Exercise Program:    [x] (01451) Reviewed/Progressed HEP activities related to strengthening, flexibility, endurance, ROM of core, proximal hip and LE for functional self-care, mobility, lifting and ambulation   [] (11364) Reviewed/Progressed HEP activities related to improving balance, coordination, kinesthetic sense, posture, motor skill, proprioception of core, proximal hip and LE for self care, mobility, lifting, and ambulation      Manual Treatments:  PROM / STM / Oscillations-Mobs:  G-I, II, III, IV (PA's, Inf., Post.)  [x] (73369) Provided manual therapy to mobilize proximal hip and LS spine soft tissue/joints for the purpose of modulating pain, promoting relaxation,  increasing ROM, reducing/eliminating soft tissue swelling/inflammation/restriction, improving soft tissue extensibility and allowing for proper ROM for normal function with self care, mobility, lifting and ambulation. Modalities:     [] GAME READY (VASO)- for significant edema, swelling, pain control. Charges:  Timed Code Treatment Minutes: 25   Total Treatment Minutes:  45   BWC:  TE TIME:  NMR TIME:  MANUAL TIME:  TA  UNTIMED MINUTES:  Medicare Total:   15  10      15        [x] EVAL (LOW) 67001 (typically 20 minutes face-to-face)  [] EVAL (MOD) 40765 (typically 30 minutes face-to-face)  [] EVAL (HIGH) 28323 (typically 45 minutes face-to-face)  [] RE-EVAL     [x] SD(84137) x     [] IONTO  [x] NMR (19832) x     [] VASO  [] Manual (13395) x     [] Dry Needle:  [] TA x      [] Mech Traction (77386)  [] ES(attended) (84768)      [] ES (un) (71003):         GOALS:     Patient stated goal: Get better     Therapist goals for Patient:   Short Term Goals: To be achieved in: 2 weeks  1. Independent in HEP and progression per patient tolerance, in order to prevent re-injury. [] Progressing: [] Met: [] Not Met: [] Adjusted  2. Patient will have a decrease in pain to facilitate improvement in movement, function, and ADLs as indicated by Functional Deficits. [] Progressing: [] Met: [] Not Met: [] Adjusted     Long Term Goals: To be achieved in: 8 weeks  1. Pt will improve Modified Oswestry to 24% or less, indicating improved functional capacity . [] Progressing: [] Met: [] Not Met: [] Adjusted  2. Patient will demonstrate increased AROM to lumbar flexion to distal 1/3 tibia to allow for proper joint functioning   [] Progressing: [] Met: [] Not Met: [] Adjusted  3. Patient will return to twisting functional activities without increased symptoms or restriction. [] Progressing: [] Met: [] Not Met: [] Adjusted  4.  Pt will be able to do dishes for 7 minutes with pain <3/10(patient specific functional goal)    [] Progressing: [] Met: [] Not Met: [] Adjusted            ASSESSMENT:  See eval        Patient received education on their current pathology and how their condition effects them with their functional activities. Patient understood discussion and questions were answered. Patient understands their activity limitations and understands rational for treatment progression. Pt educated on plan of care and HEP, if worsening symptoms to d/c that exercise. PLAN: See eval  [x] Continue per plan of care [] Alter current plan (see comments above)  [] Plan of care initiated [] Hold pending MD visit [] Discharge      Electronically signed by:  Deshaun Parsons, PT    Note: If patient does not return for scheduled/ recommended follow up visits, this note will serve as a discharge from care along with most recent update on progress.

## 2023-02-10 NOTE — PLAN OF CARE
[address] [place] [address]  [address], [place] [address] [facility], [address] [facility] [address]  Phone: [phone]   Fax:     [phone]     Physical Therapy Certification    Dear  Melissa Ibrahim MD,    We had the pleasure of evaluating the following patient for physical therapy services at [address] [place] [address]. A summary of our findings can be found in the initial assessment below. This includes our plan of care. If you have any questions or concerns regarding these findings, please do not hesitate to contact me at the office phone number checked above. Thank you for the referral.       Physician Signature:_______________________________Date:__________________  By signing above (or electronic signature), therapists plan is approved by physician      Patient: Nan Otero   : 1965   MRN: [id number]  Referring Physician:  Melissa Ibrahim MD      Evaluation Date: 2/10/2023      Medical Diagnosis Information:  Diagnosis: M54.50, G89.29 (ICD-10-CM) - Chronic bilateral low back pain without sciatica   Treatment Diagnosis: LBP, decreased flexibility                                         Insurance information: UMR, 30 PCY, 3000 ded     Precautions/ Contra-indications: None    Latex Allergy:  []NO      [x]YES    Preferred Language for Healthcare:   [x]English       []other:    SUBJECTIVE: Patient states has had back pain for a while. Difficulty standing and walking longer periods of time. Does have pain as well at N/T in right anterior thigh. Lifting heavier objects usually just cause pain the following day. Bowel/bladder incontinence or retention None    Saddle paraesthesias None    Has imaging occurred: Had MRI thoracic and lumbar 4-5 years ago.      Relevant Medical History:None    Pain Scale: 3/10, Max 8/10, Best 0/10    Easing factors: Sitting    Provocative factors: Walking, standing     Type: []Constant   [x]Intermittent []Radiating []Localized []other:     Numbness/Tingling: Right anterior thigh. Occupation/School: [employer]. Living Status/Prior Level of Function: Independent with ADLs and IADLs.      C-SSRS Triggered by Intake questionnaire (Past 2 wk assessment):   [x] No, Questionnaire did not trigger screening.   [] Yes, Patient intake triggered further evaluation      [] C-SSRS Screening completed  [] PCP notified via Plan of Care  [] Emergency services notified     OBJECTIVE:     ROM  Comments   Standing Lumbar Flexion To middle 1/3 tibia    Supine Lumbar flexion          Standing Lumbar Extension     Prone Lumbar Extension       ROM RIGHT LEFT Comments   Lumbar Side Bend To middle 1/3 femur To middle 1/3 femur    Hip Flexion 90 90    Hip Abd      Hip ER 30 30    Hip IR 20 30    Hip Extension            Knee Ext      Knee Flex            Hamstring Flex 140 140                  Strength RIGHT LEFT Comments   Seated Hip Abduction      SL Hip Abduction      Prone Hip Extension      Hip IR      Hip ER               Myotomes RIGHT LEFT Comments   Hip flexion (L1-L2) 5 5    Knee extension (L2-L4) 5 5    Dorsiflexion (L4-L5) 5 5    Great Toe Ext (L5) 5 5    Ankle PF(S1-S2) 5 5        Neural dynamic tension testing Normal Abnormal Comments   Slump Test  - Degree of knee flexion:       SLR       0-30 x     30-70 x     Femoral nerve (L2-4)        Reflexes RIGHT LEFT Comments   L3-4 Patellar tendon 2+ 2+    S1-2 Achilles 0 0    C5-6 Biceps      C6 Brachioradialis      C7-8 Triceps      Clonus      Babinski      Staley's        Joint mobility:    [x]Normal    []Hypo   []Hyper    Palpation: lumbar parapsinal tenderness    Functional Mobility/Transfers: Independent    Posture: WFL    Gait: (include devices/WB status) WFL    Bandages/Dressings/Incisions: NA    Orthopedic Special Tests:    Normal Abnormal N/A Comments   Toe walk   x      Heel Walk x      Fwd Bend-aberrant   x     Trendelenburg       Erlin GOMEZ/Martin x  Limit motion   FADIR  x  Limited motion   Hip scour       SLR       Crossed SLR       Supine to sit       Hip thrust       SI distraction/compression       PA/Spring       Prone Instability test       Prone knee bend       Sacral Spring/thrust                  [x] Patient history, allergies, meds reviewed. Medical chart reviewed. See intake form. Review Of Systems (ROS):  [x]Performed Review of systems (Integumentary, CardioPulmonary, Neurological) by intake and observation. Intake form has been scanned into medical record. Patient has been instructed to contact their primary care physician regarding ROS issues if not already being addressed at this time.       Co-morbidities/Complexities (which will affect course of rehabilitation):   []None           Arthritic conditions   []Rheumatoid arthritis (M05.9)  []Osteoarthritis (M19.91)   Cardiovascular conditions   []Hypertension (I10)  []Hyperlipidemia (E78.5)  []Angina pectoris (I20)  []Atherosclerosis (I70)   Musculoskeletal conditions   []Disc pathology   []Congenital spine pathologies   []Prior surgical intervention  []Osteoporosis (M81.8)  []Osteopenia (M85.8)   Endocrine conditions   []Hypothyroid (E03.9)  []Hyperthyroid Gastrointestinal conditions   []Constipation (S58.83)   Metabolic conditions   [x]Morbid obesity (E66.01)  []Diabetes type 1(E10.65) or 2 (E11.65)   []Neuropathy (G60.9)     Pulmonary conditions   []Asthma (J45)  []Coughing   []COPD (J44.9)   Psychological Disorders  []Anxiety (F41.9)  []Depression (F32.9)   []Other:   []Other:           Barriers to/and or personal factors that will affect rehab potential:              []Age  []Sex              []Motivation/Lack of Motivation                        []Co-Morbidities              []Cognitive Function, education/learning barriers              []Environmental, home barriers              []profession/work barriers  []past PT/medical experience  []other:  Justification:     Falls Risk Assessment (30 days):   [x] Falls Risk assessed and no intervention required. [] Falls Risk assessed and Patient requires intervention due to being higher risk   TUG score (>12s at risk):     [] Falls education provided, including      Functional Questionnaire: Modified Oswestry 34%    ASSESSMENT: Lakshmi Meyers is a 65 y.o. male reporting to OP PT with c/c of low back pain and intermittent right anterior thigh numbness/tingling which has been occurring since for several years. Pt is noted to have good strength and reflexes are symmetrical. Hip ROM is limited. Functional Impairments:     [x]Noted lumbar/proximal hip hypomobility   []Noted lumbosacral and/or generalized hypermobility   [x]Decreased Lumbosacral/hip/LE functional ROM   []Decreased core/proximal hip strength and neuromuscular control    []Decreased LE functional strength    []Abnormal reflexes/sensation/myotomal/dermatomal deficits  []Reduced balance/proprioceptive control    []other:      Functional Activity Limitations (from functional questionnaire and intake)   []Reduced ability to tolerate prolonged functional positions   [x]Reduced ability or difficulty with changes of positions or transfers between positions   [x]Reduced ability to maintain good posture and demonstrate good body mechanics with sitting, bending, and lifting   [x]Reduced ability to sleep   [] Reduced ability or tolerance with driving and/or computer work   [x]Reduced ability to perform lifting, reaching, carrying tasks   []Reduced ability to squat   [x]Reduced ability to forward bend   [x]Reduced ability to ambulate prolonged functional periods/distances/surfaces   []Reduced ability to ascend/descend stairs   []other:       Participation Restrictions   [x]Reduced participation in self care activities   [x]Reduced participation in home management activities   [x]Reduced participation in work activities   [x]Reduced participation in social activities.    []Reduced participation in sport/recreational activities. Classification:   []Signs/symptoms consistent with Lumbar instability/stabilization subgroup. []Signs/symptoms consistent with Lumbar mobilization/manipulation subgroup, myotomes and dermatomes intact. Meets manipulation criteria. []Signs/symptoms consistent with Lumbar direction specific/centralization subgroup   []Signs/symptoms consistent with Lumbar traction subgroup     []Signs/symptoms consistent with lumbar facet dysfunction   [x]Signs/symptoms consistent with lumbar stenosis type dysfunction   []Signs/symptoms consistent with nerve root involvement including myotome & dermatome dysfunction   []Signs/symptoms consistent with post-surgical status including: decreased ROM, strength and function.    []signs/symptoms consistent with pathology which may benefit from Dry needling     []other:      Prognosis/Rehab Potential:      []Excellent   [x]Good    []Fair   []Poor    Tolerance of evaluation/treatment:    []Excellent   [x]Good    []Fair   []Poor     Physical Therapy Evaluation Complexity Justification  [] A history of present problem with:  [] no personal factors and/or comorbidities that impact the plan of care;  [x]1-2 personal factors and/or comorbidities that impact the plan of care  []3 personal factors and/or comorbidities that impact the plan of care  [] An examination of body systems using standardized tests and measures addressing any of the following: body structures and functions (impairments), activity limitations, and/or participation restrictions;:  [x] a total of 1-2 or more elements   [] a total of 3 or more elements   [] a total of 4 or more elements   [] A clinical presentation with:  [x] stable and/or uncomplicated characteristics   [] evolving clinical presentation with changing characteristics  [] unstable and unpredictable characteristics;   [] Clinical decision making of [] low, [] moderate, [] high complexity using standardized patient assessment instrument and/or measurable assessment of functional outcome. [x] EVAL (LOW) 94008 (typically 20 minutes face-to-face)  [] EVAL (MOD) 33661 (typically 30 minutes face-to-face)  [] EVAL (HIGH) 77661 (typically 45 minutes face-to-face)  [] RE-EVAL     PLAN: Begin PT focusing on: proximal hip mobilizations, LB mobs, LB core activation, proximal hip activation, and HEP    Frequency/Duration:  2 days per week for 8 Weeks:  Interventions:  [x]  Therapeutic exercise including: strength training, ROM, for LE, Glutes and core   [x]  NMR activation and proprioception for glutes , LE and Core   [x]  Manual therapy as indicated for Hip complex, LE and spine to include: Dry Needling/IASTM, STM, PROM, Gr I-IV mobilizations, manipulation. [x]  Modalities as needed that may include: thermal agents, E-stim, Biofeedback, US, iontophoresis as indicated  [x]  Patient education on joint protection, postural re-education, activity modification, progression of HEP. HEP instruction: [id number]      GOALS:  Patient stated goal: Get better    Therapist goals for Patient:   Short Term Goals: To be achieved in: 2 weeks  1. Independent in HEP and progression per patient tolerance, in order to prevent re-injury. [] Progressing: [] Met: [] Not Met: [] Adjusted  2. Patient will have a decrease in pain to facilitate improvement in movement, function, and ADLs as indicated by Functional Deficits. [] Progressing: [] Met: [] Not Met: [] Adjusted    Long Term Goals: To be achieved in: 8 weeks  1. Pt will improve Modified Oswestry to 24% or less, indicating improved functional capacity . [] Progressing: [] Met: [] Not Met: [] Adjusted  2. Patient will demonstrate increased AROM to lumbar flexion to distal 1/3 tibia to allow for proper joint functioning   [] Progressing: [] Met: [] Not Met: [] Adjusted  3. Patient will return to twisting functional activities without increased symptoms or restriction.    [] Progressing: [] Met: [] Not Met: [] Adjusted  4.  Pt will be able to do dishes for 7 minutes with pain <3/10(patient specific functional goal)    [] Progressing: [] Met: [] Not Met: [] Adjusted     Electronically signed by:  Lars Shanks PT

## 2023-02-17 ENCOUNTER — HOSPITAL ENCOUNTER (OUTPATIENT)
Dept: PHYSICAL THERAPY | Age: 58
Setting detail: THERAPIES SERIES
Discharge: HOME | End: 2023-02-17
Payer: COMMERCIAL

## 2023-02-17 PROCEDURE — 97112 NEUROMUSCULAR REEDUCATION: CPT

## 2023-02-17 PROCEDURE — 97110 THERAPEUTIC EXERCISES: CPT

## 2023-02-17 NOTE — FLOWSHEET NOTE
723 [facility], [address] [place] [address], [address] [place] [address] [address]  Phone: [phone]   Fax:     [phone]      Physical Therapy Treatment Note/ Progress Report:           Date:  2023    Patient Name:  Conchis Hatfield    :  1965  MRN: [id number]  Restrictions/Precautions:    Medical/Treatment Diagnosis Information:  Diagnosis: M54.50, G89.29 (ICD-10-CM) - Chronic bilateral low back pain without sciatica  Treatment Diagnosis: LBP, decreased ROm  Insurance/Certification information:  PT Insurance Information: [employer], [place], [address]  Physician Information:  Referring Provider (secondary): Giovanna Rees MD  Has the plan of care been signed (Y/N):        []  Yes  [x]  No     Date of Patient follow up with Physician:     Assessment Summary: Naeem Mendosa is a 65 y.o. male reporting to OP PT with c/c of low back pain and intermittent right anterior thigh numbness/tingling which has been occurring since for several years. Pt is noted to have good strength and reflexes are symmetrical. Hip ROM is limited. If Yes:  Date Range for reporting period:  Beginning  2/10/23  Ending  3/51/77      Recertification will be due (POC Duration  / 90 days whichever is less): 4/10/23        Visit # Insurance Allowable Auth Required   In Person  30 no auth []  Yes     []  No    Tele Health   []  Yes     []  No    Total 2           Functional Scale:  Modified Oswestry 34%   Date assessed:       Latex Allergy:  [x]NO      []YES  Preferred Language for Healthcare:   [x]English       []other:      Pain level:  4/10       SUBJECTIVE:  Pt reports he was sore after his last session.           OBJECTIVE:           RESTRICTIONS/PRECAUTIONS: Latex allergy    Exercises/Interventions: HEP code: [id number]  Therapeutic Ex (47898) HEP 2/10/23 2/17/23    Warm-up   LATEX ALLERGY    Rec bike   6'           TABLE       PPT x x20 x20    Glut set x x10 2x10    Bridge x x15 2x10    Gib Kemal stretch  1' B     SKTC   10''x5    Side lying lumbar rotation S   10''x5                  SEATED                                          STANDING       HS stretch x 1' B     Lunge hip flexor stretch x 1' B     TB ext   2x15 BTB                           Manual: Longitudinal hip distraction, hip stretching into flexion/ER/IR, cross body piriformis stretching    Therapeutic Exercise and NMR EXR  [x] (23464) Provided verbal/tactile cueing for activities related to strengthening, flexibility, endurance, ROM  for improvements in proximal hip and core control with self care, mobility, lifting and ambulation.  [] (97746) Provided verbal/tactile cueing for activities related to improving balance, coordination, kinesthetic sense, posture, motor skill, proprioception  to assist with core control in self care, mobility, lifting, and ambulation.      Therapeutic Activities:    [x] (73483 or 46442) Provided verbal/tactile cueing for activities related to improving balance, coordination, kinesthetic sense, posture, motor skill, proprioception and motor activation to allow for proper function  with self care and ADLs  [] (52636) Provided training and instruction to the patient for proper core and proximal hip recruitment and positioning with ambulation re-education     Home Exercise Program:    [x] (20084) Reviewed/Progressed HEP activities related to strengthening, flexibility, endurance, ROM of core, proximal hip and LE for functional self-care, mobility, lifting and ambulation   [] (91235) Reviewed/Progressed HEP activities related to improving balance, coordination, kinesthetic sense, posture, motor skill, proprioception of core, proximal hip and LE for self care, mobility, lifting, and ambulation      Manual Treatments:  PROM / STM / Oscillations-Mobs:  G-I, II, III, IV (PA's, Inf., Post.)  [x] (81052) Provided manual therapy to mobilize proximal hip and LS spine soft tissue/joints for the purpose of modulating pain, promoting relaxation,  increasing ROM, reducing/eliminating soft tissue swelling/inflammation/restriction, improving soft tissue extensibility and allowing for proper ROM for normal function with self care, mobility, lifting and ambulation. Modalities:     [] GAME READY (VASO)- for significant edema, swelling, pain control. Charges:  Timed Code Treatment Minutes: 40   Total Treatment Minutes:  40   BWC:  TE TIME:  NMR TIME:  MANUAL TIME:  TA  UNTIMED MINUTES:  Medicare Total:   30  10              [] EVAL (LOW) 40120 (typically 20 minutes face-to-face)  [] EVAL (MOD) 85610 (typically 30 minutes face-to-face)  [] EVAL (HIGH) 44938 (typically 45 minutes face-to-face)  [] RE-EVAL     [x] JG(87341) x  2   [] IONTO  [x] NMR (05448) x 1    [] VASO  [] Manual (76417) x     [] Dry Needle:  [] TA x      [] Mech Traction (61231)  [] ES(attended) (73836)      [] ES (un) (94043):         GOALS:     Patient stated goal: Get better     Therapist goals for Patient:   Short Term Goals: To be achieved in: 2 weeks  1. Independent in HEP and progression per patient tolerance, in order to prevent re-injury. [] Progressing: [] Met: [] Not Met: [] Adjusted  2. Patient will have a decrease in pain to facilitate improvement in movement, function, and ADLs as indicated by Functional Deficits. [] Progressing: [] Met: [] Not Met: [] Adjusted     Long Term Goals: To be achieved in: 8 weeks  1. Pt will improve Modified Oswestry to 24% or less, indicating improved functional capacity . [] Progressing: [] Met: [] Not Met: [] Adjusted  2. Patient will demonstrate increased AROM to lumbar flexion to distal 1/3 tibia to allow for proper joint functioning   [] Progressing: [] Met: [] Not Met: [] Adjusted  3. Patient will return to twisting functional activities without increased symptoms or restriction. [] Progressing: [] Met: [] Not Met: [] Adjusted  4.  Pt will be able to do dishes for 7 minutes with pain <3/10(patient specific functional goal)    [] Progressing: [] Met: [] Not Met: [] Adjusted            ASSESSMENT:  See eval        Patient received education on their current pathology and how their condition effects them with their functional activities. Patient understood discussion and questions were answered. Patient understands their activity limitations and understands rational for treatment progression. Pt educated on plan of care and HEP, if worsening symptoms to d/c that exercise. PLAN: See eval  [x] Continue per plan of care [] Alter current plan (see comments above)  [] Plan of care initiated [] Hold pending MD visit [] Discharge      Electronically signed by:  Surjit No PT    Note: If patient does not return for scheduled/ recommended follow up visits, this note will serve as a discharge from care along with most recent update on progress.

## 2023-02-24 ENCOUNTER — HOSPITAL ENCOUNTER (OUTPATIENT)
Dept: PHYSICAL THERAPY | Age: 58
Setting detail: THERAPIES SERIES
End: 2023-02-24
Payer: COMMERCIAL

## 2023-03-03 ENCOUNTER — HOSPITAL ENCOUNTER (OUTPATIENT)
Dept: PHYSICAL THERAPY | Age: 58
Setting detail: THERAPIES SERIES
Discharge: HOME | End: 2023-03-03

## 2023-03-03 NOTE — FLOWSHEET NOTE
723 [facility], [address] [place] [address], [address] [place] [address] [address]  Phone: [phone]   Fax:     [phone]    Physical Therapy  Cancellation/No-show Note  Patient Name:  Lindsey Brownlee  :  1965   Date:  3/3/2023    Cancelled visits to date: 1  No-shows to date: 0    For today's appointment patient:  [x]  Cancelled  []  Rescheduled appointment  []  No-show     Reason given by patient:  [x]  Patient ill  []  Conflicting appointment  []  No transportation    []  Conflict with work  []  No reason given  []  Other:     Comments:      Phone call information:   []  Phone call made today to patient at am/pm at the number provided:      []  Patient answered, conversation as follows:    []  Patient did not answer, message left as follows:  [x]  Phone call not needed - pt contacted us to cancel and provided reason for cancellation.      Electronically signed by:  Kiol Downing PT, PT

## 2023-03-09 ENCOUNTER — HOSPITAL ENCOUNTER (OUTPATIENT)
Dept: PHYSICAL THERAPY | Age: 58
Setting detail: THERAPIES SERIES
Discharge: HOME | End: 2023-03-09

## 2023-03-09 NOTE — FLOWSHEET NOTE
723 [facility], [address] [place] [address], [address] [place] [address] [address]  Phone: [phone]   Fax:     [phone]    Physical Therapy  Cancellation/No-show Note  Patient Name:  Coleen Gomez  :  1965   Date:  3/9/2023    Cancelled visits to date: 1  No-shows to date: 1    For today's appointment patient:  []  Cancelled  []  Rescheduled appointment  [x]  No-show     Reason given by patient:  []  Patient ill  []  Conflicting appointment  []  No transportation    []  Conflict with work  [x]  No reason given  []  Other:     Comments:      Phone call information:   []  Phone call made today to patient at am/pm at the number provided:      []  Patient answered, conversation as follows:    []  Patient did not answer, message left as follows:  [x]  Phone call not needed - pt contacted us to cancel and provided reason for cancellation.      Electronically signed by:  Hector Gill PT, PT

## 2023-05-23 ENCOUNTER — PATIENT MESSAGE (OUTPATIENT)
Dept: FAMILY MEDICINE CLINIC | Age: 58
End: 2023-05-23

## 2023-05-23 NOTE — TELEPHONE ENCOUNTER
From: Alexander Harmon  To: Dr. García Miu: 5/23/2023 1:28 AM EDT  Subject: Pain in right hand     Hello, Ive been having some numbness and pain in my right hand. It seems to happen when Im driving and holding the steering wheel, bending my arm while holding the telephone to my ear, or using tools to remove bolts and screws, using a mouse on the computer and sometimes it even bothers me when Im eating. When Im holding a spoon or a fork. I was wanting to know if I could get a referral to see someone about it or if I need to go to see you first. Reshma Llanos had tennis elbow in the past but this is different. I have been wearing the arm band for the tennis elbow. But it doesnt seem to help.  Thanks, Melissa Sesay

## 2023-05-24 SDOH — ECONOMIC STABILITY: HOUSING INSECURITY
IN THE LAST 12 MONTHS, WAS THERE A TIME WHEN YOU DID NOT HAVE A STEADY PLACE TO SLEEP OR SLEPT IN A SHELTER (INCLUDING NOW)?: PATIENT REFUSED

## 2023-05-24 SDOH — ECONOMIC STABILITY: FOOD INSECURITY: WITHIN THE PAST 12 MONTHS, THE FOOD YOU BOUGHT JUST DIDN'T LAST AND YOU DIDN'T HAVE MONEY TO GET MORE.: PATIENT DECLINED

## 2023-05-24 SDOH — ECONOMIC STABILITY: TRANSPORTATION INSECURITY
IN THE PAST 12 MONTHS, HAS LACK OF TRANSPORTATION KEPT YOU FROM MEETINGS, WORK, OR FROM GETTING THINGS NEEDED FOR DAILY LIVING?: PATIENT DECLINED

## 2023-05-24 SDOH — ECONOMIC STABILITY: INCOME INSECURITY: HOW HARD IS IT FOR YOU TO PAY FOR THE VERY BASICS LIKE FOOD, HOUSING, MEDICAL CARE, AND HEATING?: PATIENT DECLINED

## 2023-05-24 SDOH — ECONOMIC STABILITY: FOOD INSECURITY: WITHIN THE PAST 12 MONTHS, YOU WORRIED THAT YOUR FOOD WOULD RUN OUT BEFORE YOU GOT MONEY TO BUY MORE.: PATIENT DECLINED

## 2023-05-26 ENCOUNTER — OFFICE VISIT (OUTPATIENT)
Dept: FAMILY MEDICINE CLINIC | Age: 58
End: 2023-05-26
Payer: COMMERCIAL

## 2023-05-26 VITALS
HEART RATE: 62 BPM | OXYGEN SATURATION: 94 % | BODY MASS INDEX: 45.94 KG/M2 | DIASTOLIC BLOOD PRESSURE: 76 MMHG | WEIGHT: 315 LBS | SYSTOLIC BLOOD PRESSURE: 128 MMHG

## 2023-05-26 DIAGNOSIS — K22.4 ESOPHAGEAL DYSMOTILITY: Primary | ICD-10-CM

## 2023-05-26 DIAGNOSIS — K21.9 GASTROESOPHAGEAL REFLUX DISEASE, UNSPECIFIED WHETHER ESOPHAGITIS PRESENT: ICD-10-CM

## 2023-05-26 PROCEDURE — 99214 OFFICE O/P EST MOD 30 MIN: CPT | Performed by: STUDENT IN AN ORGANIZED HEALTH CARE EDUCATION/TRAINING PROGRAM

## 2023-05-26 PROCEDURE — 3074F SYST BP LT 130 MM HG: CPT | Performed by: STUDENT IN AN ORGANIZED HEALTH CARE EDUCATION/TRAINING PROGRAM

## 2023-05-26 PROCEDURE — 3078F DIAST BP <80 MM HG: CPT | Performed by: STUDENT IN AN ORGANIZED HEALTH CARE EDUCATION/TRAINING PROGRAM

## 2023-05-26 RX ORDER — PANTOPRAZOLE SODIUM 40 MG/1
40 TABLET, DELAYED RELEASE ORAL
Qty: 90 TABLET | Refills: 1 | Status: SHIPPED | OUTPATIENT
Start: 2023-05-26

## 2023-05-26 SDOH — ECONOMIC STABILITY: FOOD INSECURITY: WITHIN THE PAST 12 MONTHS, THE FOOD YOU BOUGHT JUST DIDN'T LAST AND YOU DIDN'T HAVE MONEY TO GET MORE.: PATIENT DECLINED

## 2023-05-26 SDOH — ECONOMIC STABILITY: INCOME INSECURITY: HOW HARD IS IT FOR YOU TO PAY FOR THE VERY BASICS LIKE FOOD, HOUSING, MEDICAL CARE, AND HEATING?: PATIENT DECLINED

## 2023-05-26 SDOH — ECONOMIC STABILITY: FOOD INSECURITY: WITHIN THE PAST 12 MONTHS, YOU WORRIED THAT YOUR FOOD WOULD RUN OUT BEFORE YOU GOT MONEY TO BUY MORE.: PATIENT DECLINED

## 2023-05-26 NOTE — PROGRESS NOTES
5/26/2023    This is a 63 y.o. male who presents for  Chief Complaint   Patient presents with    Other     Has been having hard time swallowing, hurts when swallowing        HPI:     Pain when swallowing  - when COVID hit, was sent to ENT, but didn't follow up because of covid  - happens once a week  - when he first starts eating, usually with a liquid  - feels like an air bubble and pressure  - will feel like he needs to burp, but not always able to  - no trouble breathing at all  - sometimes with pop, will feel lit fizz back up  - 18 years ago did have acid reflux so badly that stomach acid would come up  - did take PPI for a year, then stopped eating a t night with helped       Past Medical History:   Diagnosis Date    Acid reflux     Anemia 04/27/2021    Anesthesia complication     was told that was combative when coming out of anes    Body mass index (BMI) of 40.0 to 44.9 in adult [facility]) 9/28/2017    Cancer ([facility] [address]     malignant melanoma-back-stage 3    Chronic right-sided thoracic back pain 05/22/2019    Headache     Hearing loss ?     Hx of colonic polyps 10/28/2019    10.2019    Hx of melanoma excision 09/27/2017    Hypertension     Positive FIT (fecal immunochemical test) 10/28/2019    10.2019    Prediabetes 05/22/2019    5.8    Pulmonary nodules 10/28/2019    10.2019 2 small groundglass nodules within the central aspect of the right upper lobe which measures 8 mm a few additional 2 mm noncalcified nodules throughout the lung 2010 pulm nodules     Sleep apnea     uses c-pap    Wears glasses        Past Surgical History:   Procedure Laterality Date    COLONOSCOPY  12/2019    Dr. Francisco Hernadez    COLONOSCOPY N/A 12/9/2019    COLONOSCOPY POLYPECTOMY SNARE/COLD BIOPSY performed by Aminta Villalobos MD at [address] [place] [address]      wide excision to remove malignant melanoma from back and removed lymph nodes    WISDOM TOOTH EXTRACTION         Social History     Socioeconomic History    Marital

## 2023-07-27 DIAGNOSIS — I10 ESSENTIAL HYPERTENSION: ICD-10-CM

## 2023-07-28 NOTE — TELEPHONE ENCOUNTER
Refill Request       Last Seen: Last Seen Department: Visit date not found  Last Seen by PCP: 5/26/2023    Last Written: 1/27/2023 90 WITH 1    Next Appointment:   Future Appointments   Date Time Provider [address] [place] [address]   9/5/2023  8:00 AM Stefania Wolf MD [facility]             Requested Prescriptions     Pending Prescriptions Disp Refills    lisinopril-hydroCHLOROthiazide (PRINZIDE;ZESTORETIC) 20-12.5 MG per tablet [Pharmacy Med Name: LISINOPRIL-HCTZ 20/12.5MG TABLETS] 90 tablet 1     Sig: TAKE 1 TABLET BY MOUTH DAILY

## 2023-07-31 RX ORDER — LISINOPRIL AND HYDROCHLOROTHIAZIDE 20; 12.5 MG/1; MG/1
1 TABLET ORAL DAILY
Qty: 90 TABLET | Refills: 1 | Status: SHIPPED | OUTPATIENT
Start: 2023-07-31

## 2023-09-05 ENCOUNTER — HOSPITAL ENCOUNTER (OUTPATIENT)
Age: 58
Discharge: HOME | End: 2023-09-05
Payer: COMMERCIAL

## 2023-09-05 ENCOUNTER — OFFICE VISIT (OUTPATIENT)
Dept: PRIMARY CARE CLINIC | Age: 58
End: 2023-09-05
Payer: COMMERCIAL

## 2023-09-05 VITALS
HEART RATE: 53 BPM | BODY MASS INDEX: 44.98 KG/M2 | RESPIRATION RATE: 16 BRPM | WEIGHT: 314.2 LBS | SYSTOLIC BLOOD PRESSURE: 119 MMHG | DIASTOLIC BLOOD PRESSURE: 81 MMHG | TEMPERATURE: 97.5 F | HEIGHT: 70 IN | OXYGEN SATURATION: 96 %

## 2023-09-05 DIAGNOSIS — Z00.00 ROUTINE ADULT HEALTH MAINTENANCE: ICD-10-CM

## 2023-09-05 DIAGNOSIS — D03.59 MELANOMA IN SITU OF BACK (HCC): ICD-10-CM

## 2023-09-05 DIAGNOSIS — E66.01 CLASS 3 SEVERE OBESITY DUE TO EXCESS CALORIES WITH BODY MASS INDEX (BMI) OF 45.0 TO 49.9 IN ADULT, UNSPECIFIED WHETHER SERIOUS COMORBIDITY PRESENT (HCC): ICD-10-CM

## 2023-09-05 DIAGNOSIS — Z00.00 ANNUAL PHYSICAL EXAM: Primary | ICD-10-CM

## 2023-09-05 DIAGNOSIS — Z00.00 ANNUAL PHYSICAL EXAM: ICD-10-CM

## 2023-09-05 LAB
ALBUMIN SERPL-MCNC: 4.4 G/DL (ref 3.4–5)
ALBUMIN/GLOB SERPL: 1.4 {RATIO} (ref 1.1–2.2)
ALP SERPL-CCNC: 68 U/L (ref 40–129)
ALT SERPL-CCNC: 31 U/L (ref 10–40)
ANION GAP SERPL CALCULATED.3IONS-SCNC: 17 MMOL/L (ref 3–16)
AST SERPL-CCNC: 33 U/L (ref 15–37)
BASOPHILS # BLD: 0 K/UL (ref 0–0.2)
BASOPHILS NFR BLD: 0.3 %
BILIRUB SERPL-MCNC: 0.6 MG/DL (ref 0–1)
BUN SERPL-MCNC: 20 MG/DL (ref 7–20)
CALCIUM SERPL-MCNC: 9.6 MG/DL (ref 8.3–10.6)
CHLORIDE SERPL-SCNC: 101 MMOL/L (ref 99–110)
CHOLEST SERPL-MCNC: 109 MG/DL (ref 0–199)
CO2 SERPL-SCNC: 21 MMOL/L (ref 21–32)
CREAT SERPL-MCNC: 1 MG/DL (ref 0.9–1.3)
DEPRECATED RDW RBC AUTO: 14.1 % (ref 12.4–15.4)
EOSINOPHIL # BLD: 0.1 K/UL (ref 0–0.6)
EOSINOPHIL NFR BLD: 1.4 %
GFR SERPLBLD CREATININE-BSD FMLA CKD-EPI: >60 ML/MIN/{1.73_M2}
GLUCOSE SERPL-MCNC: 105 MG/DL (ref 70–99)
HCT VFR BLD AUTO: 40.3 % (ref 40.5–52.5)
HDLC SERPL-MCNC: 38 MG/DL (ref 40–60)
HGB BLD-MCNC: 14.3 G/DL (ref 13.5–17.5)
LDL CHOLESTEROL CALCULATED: 52 MG/DL
LYMPHOCYTES # BLD: 1.8 K/UL (ref 1–5.1)
LYMPHOCYTES NFR BLD: 40.2 %
MCH RBC QN AUTO: 31.1 PG (ref 26–34)
MCHC RBC AUTO-ENTMCNC: 35.4 G/DL (ref 31–36)
MCV RBC AUTO: 87.8 FL (ref 80–100)
MONOCYTES # BLD: 0.3 K/UL (ref 0–1.3)
MONOCYTES NFR BLD: 5.9 %
NEUTROPHILS # BLD: 2.4 K/UL (ref 1.7–7.7)
NEUTROPHILS NFR BLD: 52.2 %
PLATELET # BLD AUTO: 245 K/UL (ref 135–450)
PMV BLD AUTO: 7.5 FL (ref 5–10.5)
POTASSIUM SERPL-SCNC: 3.9 MMOL/L (ref 3.5–5.1)
PROT SERPL-MCNC: 7.6 G/DL (ref 6.4–8.2)
PSA SERPL DL<=0.01 NG/ML-MCNC: 0.97 NG/ML (ref 0–4)
RBC # BLD AUTO: 4.58 M/UL (ref 4.2–5.9)
SODIUM SERPL-SCNC: 139 MMOL/L (ref 136–145)
TRIGL SERPL-MCNC: 94 MG/DL (ref 0–150)
VLDLC SERPL CALC-MCNC: 19 MG/DL
WBC # BLD AUTO: 4.5 K/UL (ref 4–11)

## 2023-09-05 PROCEDURE — 80061 LIPID PANEL: CPT

## 2023-09-05 PROCEDURE — 36415 COLL VENOUS BLD VENIPUNCTURE: CPT

## 2023-09-05 PROCEDURE — 85025 COMPLETE CBC W/AUTO DIFF WBC: CPT

## 2023-09-05 PROCEDURE — 3074F SYST BP LT 130 MM HG: CPT | Performed by: STUDENT IN AN ORGANIZED HEALTH CARE EDUCATION/TRAINING PROGRAM

## 2023-09-05 PROCEDURE — 99396 PREV VISIT EST AGE 40-64: CPT | Performed by: STUDENT IN AN ORGANIZED HEALTH CARE EDUCATION/TRAINING PROGRAM

## 2023-09-05 PROCEDURE — 84153 ASSAY OF PSA TOTAL: CPT

## 2023-09-05 PROCEDURE — 3078F DIAST BP <80 MM HG: CPT | Performed by: STUDENT IN AN ORGANIZED HEALTH CARE EDUCATION/TRAINING PROGRAM

## 2023-09-05 PROCEDURE — 83036 HEMOGLOBIN GLYCOSYLATED A1C: CPT

## 2023-09-05 PROCEDURE — 80053 COMPREHEN METABOLIC PANEL: CPT

## 2023-09-05 ASSESSMENT — PATIENT HEALTH QUESTIONNAIRE - PHQ9
4. FEELING TIRED OR HAVING LITTLE ENERGY: 0
SUM OF ALL RESPONSES TO PHQ QUESTIONS 1-9: 0
1. LITTLE INTEREST OR PLEASURE IN DOING THINGS: 0
SUM OF ALL RESPONSES TO PHQ QUESTIONS 1-9: 0
9. THOUGHTS THAT YOU WOULD BE BETTER OFF DEAD, OR OF HURTING YOURSELF: 0
SUM OF ALL RESPONSES TO PHQ9 QUESTIONS 1 & 2: 0
10. IF YOU CHECKED OFF ANY PROBLEMS, HOW DIFFICULT HAVE THESE PROBLEMS MADE IT FOR YOU TO DO YOUR WORK, TAKE CARE OF THINGS AT HOME, OR GET ALONG WITH OTHER PEOPLE: 0
SUM OF ALL RESPONSES TO PHQ QUESTIONS 1-9: 0
3. TROUBLE FALLING OR STAYING ASLEEP: 0
2. FEELING DOWN, DEPRESSED OR HOPELESS: 0
6. FEELING BAD ABOUT YOURSELF - OR THAT YOU ARE A FAILURE OR HAVE LET YOURSELF OR YOUR FAMILY DOWN: 0
SUM OF ALL RESPONSES TO PHQ QUESTIONS 1-9: 0
8. MOVING OR SPEAKING SO SLOWLY THAT OTHER PEOPLE COULD HAVE NOTICED. OR THE OPPOSITE, BEING SO FIGETY OR RESTLESS THAT YOU HAVE BEEN MOVING AROUND A LOT MORE THAN USUAL: 0
5. POOR APPETITE OR OVEREATING: 0
7. TROUBLE CONCENTRATING ON THINGS, SUCH AS READING THE NEWSPAPER OR WATCHING TELEVISION: 0

## 2023-09-05 ASSESSMENT — ANXIETY QUESTIONNAIRES
6. BECOMING EASILY ANNOYED OR IRRITABLE: 0
IF YOU CHECKED OFF ANY PROBLEMS ON THIS QUESTIONNAIRE, HOW DIFFICULT HAVE THESE PROBLEMS MADE IT FOR YOU TO DO YOUR WORK, TAKE CARE OF THINGS AT HOME, OR GET ALONG WITH OTHER PEOPLE: NOT DIFFICULT AT ALL
1. FEELING NERVOUS, ANXIOUS, OR ON EDGE: 0
3. WORRYING TOO MUCH ABOUT DIFFERENT THINGS: 0
2. NOT BEING ABLE TO STOP OR CONTROL WORRYING: 0
4. TROUBLE RELAXING: 0
7. FEELING AFRAID AS IF SOMETHING AWFUL MIGHT HAPPEN: 0
GAD7 TOTAL SCORE: 0
5. BEING SO RESTLESS THAT IT IS HARD TO SIT STILL: 0

## 2023-09-05 NOTE — PROGRESS NOTES
mis-transcribed. Return in about 6 months (around 3/5/2024) for Med Check.     Kelly Ackerman MD  9/8/2023

## 2023-09-06 LAB
EST. AVERAGE GLUCOSE BLD GHB EST-MCNC: 111.2 MG/DL
HBA1C MFR BLD: 5.5 %

## 2023-09-08 ENCOUNTER — OFFICE VISIT (OUTPATIENT)
Dept: URGENT CARE | Age: 58
End: 2023-09-08

## 2023-09-08 VITALS
RESPIRATION RATE: 18 BRPM | BODY MASS INDEX: 45.05 KG/M2 | HEART RATE: 65 BPM | TEMPERATURE: 98.1 F | WEIGHT: 314 LBS | SYSTOLIC BLOOD PRESSURE: 125 MMHG | OXYGEN SATURATION: 94 % | DIASTOLIC BLOOD PRESSURE: 81 MMHG

## 2023-09-08 DIAGNOSIS — M25.511 ACUTE PAIN OF RIGHT SHOULDER: Primary | ICD-10-CM

## 2023-09-08 PROBLEM — K21.9 GASTROESOPHAGEAL REFLUX DISEASE: Status: ACTIVE | Noted: 2023-09-08

## 2023-09-08 NOTE — PATIENT INSTRUCTIONS
Recommend continued treatment with ice and provided exercises. Continue Tylenol for pain control. Follow up orthopedics if symptoms persist or if symptoms worsen.

## 2023-09-08 NOTE — PROGRESS NOTES
Alexander Harmon (: 1965) is a 63 y.o. male,New patient, here for evaluation of the following chief complaint(s):  Shoulder Pain (Sx started Wednesday after surgery. Sharp Pain in right shoulder.)      ASSESSMENT/PLAN:    ICD-10-CM    1. Acute pain of right shoulder  M25.[facility]          Despite the pain of the shoulder, given no swelling, bruising, slow and non-traumatic cause of injury, and ability for full range of shoulder motion, low concerns for fracture. Pain with palpation and with ROM test noted to the anterior shoulder and across into the upper right pectoral muscle. Possible muscle strain vs developing adhesive capsulitis vs altered sleeping/positioning due to altered sleeping/movement secondary to the recent surgery. Discussed stretches and exercises that can help. Application of ice and with intermittent exercises. Continue Tylenol for pain control. After discussion, provided orthopedic referral due to pt's concerns for worsening of the symptoms. Follow up orthopedics if symptoms persist or if symptoms worsen. SUBJECTIVE/OBJECTIVE:  HPI:   63 y.o. male presents for complaint of right shoulder pain x last night. Notes the right shoulder started as an ache, then worsened steadily over today. Pt notes he thinks he \"pulled something\". States he recently received a surgery under the right breast to remove a questionable skin lesion by dermatology 2 days ago, so has not been doing anything strenuous since that procedure. States taking deep breaths, and moving the shoulder around increases the pain to about 7/10. Notes when relaxing the shoulder, notes the pain is able to subside significantly - down to 2/10. Notes pain is an achy pain that radiates from the outer shoulder into upper pectoral muscle. Notes pressure over the area relieves the pressure. Denies symptoms of numbness, tingling, loss of ROM, bruising, redness, and swelling.  Also

## 2023-10-04 ENCOUNTER — ANESTHESIA EVENT (OUTPATIENT)
Dept: ENDOSCOPY | Age: 58
End: 2023-10-04
Payer: COMMERCIAL

## 2023-10-19 ENCOUNTER — HOSPITAL ENCOUNTER (OUTPATIENT)
Age: 58
Setting detail: OUTPATIENT SURGERY
Discharge: HOME | End: 2023-10-19
Attending: INTERNAL MEDICINE | Admitting: INTERNAL MEDICINE
Payer: COMMERCIAL

## 2023-10-19 ENCOUNTER — ANESTHESIA (OUTPATIENT)
Dept: ENDOSCOPY | Age: 58
End: 2023-10-19
Payer: COMMERCIAL

## 2023-10-19 VITALS
HEART RATE: 63 BPM | TEMPERATURE: 97 F | OXYGEN SATURATION: 97 % | WEIGHT: 315 LBS | DIASTOLIC BLOOD PRESSURE: 75 MMHG | BODY MASS INDEX: 59.47 KG/M2 | SYSTOLIC BLOOD PRESSURE: 126 MMHG | RESPIRATION RATE: 16 BRPM | HEIGHT: 61 IN

## 2023-10-19 DIAGNOSIS — R13.19 ESOPHAGEAL DYSPHAGIA: ICD-10-CM

## 2023-10-19 PROCEDURE — 88305 TISSUE EXAM BY PATHOLOGIST: CPT

## 2023-10-19 PROCEDURE — 2709999900 HC NON-CHARGEABLE SUPPLY: Performed by: INTERNAL MEDICINE

## 2023-10-19 PROCEDURE — 7100000011 HC PHASE II RECOVERY - ADDTL 15 MIN: Performed by: INTERNAL MEDICINE

## 2023-10-19 PROCEDURE — 2500000003 HC RX 250 WO HCPCS

## 2023-10-19 PROCEDURE — 3700000001 HC ADD 15 MINUTES (ANESTHESIA): Performed by: INTERNAL MEDICINE

## 2023-10-19 PROCEDURE — C1726 CATH, BAL DIL, NON-VASCULAR: HCPCS | Performed by: INTERNAL MEDICINE

## 2023-10-19 PROCEDURE — 6360000002 HC RX W HCPCS

## 2023-10-19 PROCEDURE — 3609017700 HC EGD DILATION GASTRIC/DUODENAL STRICTURE: Performed by: INTERNAL MEDICINE

## 2023-10-19 PROCEDURE — 3609012400 HC EGD TRANSORAL BIOPSY SINGLE/MULTIPLE: Performed by: INTERNAL MEDICINE

## 2023-10-19 PROCEDURE — 7100000010 HC PHASE II RECOVERY - FIRST 15 MIN: Performed by: INTERNAL MEDICINE

## 2023-10-19 PROCEDURE — 3700000000 HC ANESTHESIA ATTENDED CARE: Performed by: INTERNAL MEDICINE

## 2023-10-19 RX ORDER — LIDOCAINE HYDROCHLORIDE 20 MG/ML
INJECTION, SOLUTION EPIDURAL; INFILTRATION; INTRACAUDAL; PERINEURAL PRN
Status: DISCONTINUED | OUTPATIENT
Start: 2023-10-19 | End: 2023-10-19 | Stop reason: SDUPTHER

## 2023-10-19 RX ORDER — SODIUM CHLORIDE 0.9 % (FLUSH) 0.9 %
5-40 SYRINGE (ML) INJECTION PRN
Status: DISCONTINUED | OUTPATIENT
Start: 2023-10-19 | End: 2023-10-19 | Stop reason: HOSPADM

## 2023-10-19 RX ORDER — SODIUM CHLORIDE 9 MG/ML
INJECTION, SOLUTION INTRAVENOUS PRN
Status: DISCONTINUED | OUTPATIENT
Start: 2023-10-19 | End: 2023-10-19 | Stop reason: HOSPADM

## 2023-10-19 RX ORDER — PROPOFOL 10 MG/ML
INJECTION, EMULSION INTRAVENOUS PRN
Status: DISCONTINUED | OUTPATIENT
Start: 2023-10-19 | End: 2023-10-19 | Stop reason: SDUPTHER

## 2023-10-19 RX ORDER — FAMOTIDINE 10 MG/ML
20 INJECTION, SOLUTION INTRAVENOUS ONCE
Status: DISCONTINUED | OUTPATIENT
Start: 2023-10-19 | End: 2023-10-19 | Stop reason: HOSPADM

## 2023-10-19 RX ORDER — SODIUM CHLORIDE 0.9 % (FLUSH) 0.9 %
5-40 SYRINGE (ML) INJECTION EVERY 12 HOURS SCHEDULED
Status: DISCONTINUED | OUTPATIENT
Start: 2023-10-19 | End: 2023-10-19 | Stop reason: HOSPADM

## 2023-10-19 RX ORDER — SODIUM CHLORIDE, SODIUM LACTATE, POTASSIUM CHLORIDE, CALCIUM CHLORIDE 600; 310; 30; 20 MG/100ML; MG/100ML; MG/100ML; MG/100ML
INJECTION, SOLUTION INTRAVENOUS CONTINUOUS
Status: DISCONTINUED | OUTPATIENT
Start: 2023-10-19 | End: 2023-10-19 | Stop reason: HOSPADM

## 2023-10-19 RX ADMIN — PROPOFOL 120 MCG/KG/MIN: 10 INJECTION, EMULSION INTRAVENOUS at 09:18

## 2023-10-19 RX ADMIN — LIDOCAINE HYDROCHLORIDE 50 MG: 20 INJECTION, SOLUTION EPIDURAL; INFILTRATION; INTRACAUDAL; PERINEURAL at 09:17

## 2023-10-19 RX ADMIN — PROPOFOL 100 MG: 10 INJECTION, EMULSION INTRAVENOUS at 09:17

## 2023-10-19 ASSESSMENT — PAIN - FUNCTIONAL ASSESSMENT: PAIN_FUNCTIONAL_ASSESSMENT: NONE - DENIES PAIN

## 2023-10-19 NOTE — DISCHARGE INSTRUCTIONS
PATIENT INSTRUCTIONS  POST-SEDATION    Alexander A Rust          IMMEDIATELY FOLLOWING PROCEDURE:    Do not drive or operate machinery for the first twenty four hours after surgery. Do not make any important decisions for twenty four hours after surgery or while taking narcotic pain medications or sedatives. You should NOT BE LEFT UNATTENDED OR ALONE. A responsible adult should be with you for the rest of the day of your procedure and also during the night for your protection and safety. You may experience some light headedness. Rest at home with activity as tolerated. You may not need to go to bed, but it is important to rest for the next 24 hours. You should not engage in athletic sports such as basketball, volleyball, jogging, skating, or activities requiring refined motor skills for 24 hours. If you develop intractable nausea and vomiting or a severe headache please notify your doctor immediately. You are not expected to have any fever, but if you feel warm, take your temperature. If you have a fever 101 degrees or higher, call your doctor. If you have had an Endoscopy:   *Eat lightly for your first meal and gradually resume your normal / prescribed diet. DO NOT eat or drink until your gag reflex returns. *If you have a sore throat you may use lozenges, or salt water gargles. *If you have had a colonoscopy, do not expect a normal bowel movement for approximately three days due to the cleansing of the large intestine prior to colonoscopy. ONCE YOU ARE HOME, IF YOU SHOULD HAVE:  Difficulty in breathing, persistent nausea or vomiting, bleeding you feel is excessive, or pain that is unusual, increased abdominal bloating, or any swelling, fever / chills, call your physician. If you cannot contact your physician, but feel that your signs and symptoms need a physician's attention, go to the Emergency Department.       FOLLOW-UP:    Please follow up with Dr. Virginia Atkinson as scheduled or

## 2023-10-19 NOTE — PROGRESS NOTES
Discharge instructions given to pt and wife, voiced understanding, questions encouraged
Pre-Operative:  1. Patient/Caregiver identifies - states name and date of birth. 2.  The patient is free from signs and symptoms of injury. 3.  The patient receives appropriate medication(s), safely administered during the Perioperative period. 4.  The patients's fluid, electrolyte, and acid-base balances are established preoperatively. 5.  The patient's pulmonary function is established preoperatively. 6.  The patient's cardiovascular status is established preoperatively. 7.  The patient / caregiver demonstrates knowledge of nutritional management related to the operative or other invasive procedure. 8.  The patient/caregiver demonstrates knowledge of medication management. 9.  The patient/caregiver demonstrates knowledge of pain management. 10.  The patient/caregiver participates in decisions affection his or her Perioperative plan of care. 11. The patient's care is consistent with the individualized Perioperative plan of care. 12.  The patient's right to privacy is maintained. 13. The patient is the recipient of competent and ethical care within legal standards of practice. 14.  The patient's value system, lifestyle, ethnicity, and culture are considered, respected, and incorporated in the Perioperative plan of care and understands special services available. 15.  The patient demonstrates and/or reports adequate pain control throughout the the Perioperative period. 16.  The patient's neurological status is established preoperatively. 17.  The patient/caregiver demonstrates knowledge of the expected responses to the endoscopy procedure. 18.  Patient/Caregiver has reduced anxiety. Interventions- Familiarize with environment and equipment. 19. Patient/Caregiver verbalizes understanding of Phase II and/or Phase I process. 20.  Patient pain level is established preoperatively using age appropriate pain scale. 21.   The patient will move to fall risk upon sedation- during and through the recovery
vision between now and the time of your surgery. To provide excellent care, visitors will be limited to two in a room at any given time. Please no children under the age of 15 in the surgical department.

## 2023-10-19 NOTE — ANESTHESIA PRE PROCEDURE
\"BEART\", \"[id number]\"     Type & Screen (If Applicable):  No results found for: \"LABABO\", \"LABRH\"    Drug/Infectious Status (If Applicable):  No results found for: \"HIV\", \"HEPCAB\"    COVID-19 Screening (If Applicable): No results found for: \"COVID19\"        Anesthesia Evaluation  Patient summary reviewed and Nursing notes reviewed no history of anesthetic complications:   Airway: Mallampati: II  TM distance: >3 FB   Neck ROM: full  Mouth opening: > = 3 FB   Dental: normal exam         Pulmonary:   (+) sleep apnea: on CPAP,                             Cardiovascular:    (+) hypertension:,                   Neuro/Psych:   (+) headaches:,             GI/Hepatic/Renal:   (+) GERD:, morbid obesity (BMI 45)     (-) liver disease and no renal disease       Endo/Other: Negative Endo/Other ROS       (-) diabetes mellitus               Abdominal:             Vascular: negative vascular ROS. Other Findings:           Anesthesia Plan      MAC     ASA 3       Induction: intravenous. Anesthetic plan and risks discussed with patient. Plan discussed with CRNA.                     Gulshan Cruz MD   10/19/2023

## 2023-10-19 NOTE — H&P
on file    Highest education level: Not on file   Occupational History    Not on file   Tobacco Use    Smoking status: Never    Smokeless tobacco: Never    Tobacco comments:     never used   Vaping Use    Vaping Use: Never used   Substance and Sexual Activity    Alcohol use: Never    Drug use: Never    Sexual activity: Yes     Partners: Female     Comment: Wife   Other Topics Concern    Not on file   Social History Narrative    Not on file     Social Determinants of Health     Financial Resource Strain: Unknown (5/26/2023)    Overall Financial Resource Strain (CARDIA)     Difficulty of Paying Living Expenses: Patient refused   Food Insecurity: Unknown (5/26/2023)    Hunger Vital Sign     Worried About Running Out of Food in the Last Year: Patient refused     [address] in the Last Year: Patient refused   Transportation Needs: Unknown (5/26/2023)    PRAPARE - Transportation     Lack of Transportation (Medical):  Not on file     Lack of Transportation (Non-Medical): Patient refused   Physical Activity: Inactive (1/27/2023)    Exercise Vital Sign     Days of Exercise per Week: 0 days     Minutes of Exercise per Session: 0 min   Stress: Not on file   Social Connections: Not on file   Intimate Partner Violence: Not At Risk (1/27/2023)    Humiliation, Afraid, Rape, and Kick questionnaire     Fear of Current or Ex-Partner: No     Emotionally Abused: No     Physically Abused: No     Sexually Abused: No   Housing Stability: Unknown (5/26/2023)    Housing Stability Vital Sign     Unable to Pay for Housing in the Last Year: Not on file     Number of [address] in the Last Year: Not on file     Unstable Housing in the Last Year: Patient refused       Family History:   Family History   Problem Relation Age of Onset    COPD Mother     High Blood Pressure Mother     Lung Cancer Father         smoker    Mental Retardation Brother     Liver Cancer Maternal Grandmother 61    Stroke Paternal Grandmother     Diabetes Paternal no

## 2023-10-19 NOTE — ANESTHESIA POSTPROCEDURE EVALUATION
Department of Anesthesiology  Postprocedure Note    Patient: August Box  MRN: [id number]  YOB: 1965  Date of evaluation: 10/19/2023      Procedure Summary     Date: 10/19/23 Room / Location: [facility] ENDO 01 / [facility]    Anesthesia Start: 4168 Anesthesia Stop: 0933    Procedures:       EGD DILATION BALLOON      EGD BIOPSY Diagnosis:       Esophageal dysphagia      (Esophageal dysphagia [R13.19])    Surgeons: Aneesh Mcclain DO Responsible Provider: Evelyn Felipe MD    Anesthesia Type: MAC ASA Status: 3          Anesthesia Type: No value filed.     Pato Phase I: Pato Score: 10    Pato Phase II: Pato Score: 9      Anesthesia Post Evaluation    Patient location during evaluation: PACU  Level of consciousness: awake  Airway patency: patent  Nausea & Vomiting: no nausea  Complications: no  Cardiovascular status: blood pressure returned to baseline  Respiratory status: acceptable  Hydration status: euvolemic  Pain management: adequate

## 2023-10-19 NOTE — PROCEDURES
EGD PROCEDURE NOTE         Esophagogastroduodenoscopy Procedure Note     Patient: Angely Patel MRN: [id number]   YOB: 1965 Age: 63 y.o. Sex: male   Unit: [facility] ENDOSCOPY Room/Bed: ENDO/NONE Location: [address] [place] [address],[address]    Admitting Physician: Keira Rice     Primary Care Physician: Dozier Sandifer, MD      DATE OF PROCEDURE: 10/19/2023  PROCEDURE: Esophagogastroduodenoscopy  INDICATION: This is a 63y.o. year old male who presents today dysphagia  ENDOSCOPIST: Dayna Jules DO    POSTOPERATIVE DIAGNOSIS:  fundic gland polyps  Distal esophageal spasm  PLAN: Await results of biopsies  Continue with Protonix daily  Trial of Levsin SL as needed    INFORMED CONSENT:  Informed consent for esophagogastoduodenoscopy was obtained. The benefits and risks including adverse medicine reaction have been explained. The patient's questions were answered and the patient agreed to proceed. ASA:  ASA 3 - Patient with moderate systemic disease with functional limitations    SEDATION: MAC     The patient's vital signs, cardiac status, pulmonary status, abdominal status and mental status were stable for the procedure. The patient's vitals signs and respiratory function as monitored by oxygen saturation were stable throughout    Procedure Details: The Olympus videoendoscope was inserted into the mouth and carefully passed into the esophagus, through the stomach and to the distal duodenum. Antegrade and retrograde examination of the upper gi tract was carefully performed. Findings: The esophagus is normal.  The z-line is distinct without lesions or irregularities. There is no evidence of Bardales's esophagus. There was spasm of the distal esophagus making advancement into the stomach requiring a little more effort. The mucosa of the cardia, fundus, body and antrum of the stomach is normal.  With the exception of : Fundic gland polyps.   The pylorus is patent and of normal

## 2023-11-05 DIAGNOSIS — M54.50 CHRONIC BILATERAL LOW BACK PAIN WITHOUT SCIATICA: ICD-10-CM

## 2023-11-05 DIAGNOSIS — G89.29 CHRONIC BILATERAL LOW BACK PAIN WITHOUT SCIATICA: ICD-10-CM

## 2023-11-05 NOTE — TELEPHONE ENCOUNTER
Refill Request     CONFIRM preferred pharmacy with the patient. If Mail Order Rx - Pend for 90 day refill. Last Seen: Last Seen Department: 5/26/2023  Last Seen by PCP: 9/5/2023    Last Written: 1/27/23 4 refill    If no future appointment scheduled:  Review the last OV with PCP and review information for follow-up visit,  Route STAFF MESSAGE with patient name to the [facility] for scheduling with the following information:            -  Timing of next visit           -  Visit type ie Physical, OV, etc           -  Diagnoses/Reason ie. COPD, HTN - Do not use MEDICATION, Follow-up or CHECK UP - Give reason for visit      Next Appointment:   Future Appointments   Date Time Provider [address]   3/5/2024  8:00 AM Gary Brooks MD [address] [facility] MMA       Message sent to [facility] to schedule appt with patient?   NO      Requested Prescriptions     Pending Prescriptions Disp Refills    meloxicam (MOBIC) 7.5 MG tablet [Pharmacy Med Name: MELOXICAM 7.5MG TABLETS] 60 tablet 4     Sig: TAKE 1 TO 2 TABLETS BY MOUTH DAILY WITH FOOD AS NEEDED FOR BACK PAIN

## 2023-11-06 RX ORDER — MELOXICAM 7.5 MG/1
TABLET ORAL
Qty: 60 TABLET | Refills: 4 | Status: SHIPPED | OUTPATIENT
Start: 2023-11-06

## 2023-12-14 ENCOUNTER — HOSPITAL ENCOUNTER (EMERGENCY)
Age: 58
Discharge: HOME | End: 2023-12-14
Attending: STUDENT IN AN ORGANIZED HEALTH CARE EDUCATION/TRAINING PROGRAM
Payer: COMMERCIAL

## 2023-12-14 VITALS
DIASTOLIC BLOOD PRESSURE: 81 MMHG | SYSTOLIC BLOOD PRESSURE: 139 MMHG | BODY MASS INDEX: 42.09 KG/M2 | OXYGEN SATURATION: 99 % | HEART RATE: 63 BPM | WEIGHT: 294 LBS | TEMPERATURE: 97.9 F | HEIGHT: 70 IN

## 2023-12-14 DIAGNOSIS — K42.9 UMBILICAL HERNIA WITHOUT OBSTRUCTION AND WITHOUT GANGRENE: Primary | ICD-10-CM

## 2023-12-14 PROCEDURE — 99282 EMERGENCY DEPT VISIT SF MDM: CPT

## 2023-12-14 NOTE — ED PROVIDER NOTES
diagnoses include umbilical hernia, incarcerated/strangulated hernia, constipation. No sign of ischemia or other concerning symptoms at this time. Plan to discharge with general surgery follow up to discuss hernia repair. CONSULTS: None   Social Determinants: None   Chronic Conditions: NA    Disposition Considerations: None      I am the primary physician of Record. FINAL IMPRESSION    1.  Umbilical hernia without obstruction and without gangrene         DISPOSITION/PLAN   DISPOSITION Decision To Discharge 12/14/2023 09:19:27 AM       PATIENT REFERRED TO:   Keara Marrero MD  [facility]  [place]: [address] [place] [address]  [phone]           DISCHARGE MEDICATIONS:   New Prescriptions    No medications on file      DISCONTINUED MEDICATIONS:   Discontinued Medications    No medications on file          (Please note that portions of this note were completed with a voice recognition program.  Efforts were made to edit the dictations but occasionally words are mis-transcribed.)     Leilani Purcell DO, PGY-1  [facility] and [address]  Resident  12/14/23 [id number]

## 2023-12-14 NOTE — ED NOTES
Patient discharged with all belongings and discharge paperwork. Patient verbalized understanding of discharge instructions and follow up with General Surgery, referral provided. Patient ambulatory out of ED with steady gait.      Charisse Chatman RN  12/14/23 [id number]

## 2023-12-22 PROBLEM — K42.9 UMBILICAL HERNIA WITHOUT OBSTRUCTION AND WITHOUT GANGRENE: Status: ACTIVE | Noted: 2023-12-22

## 2023-12-28 DIAGNOSIS — Z01.812 PRE-PROCEDURE LAB EXAM: ICD-10-CM

## 2023-12-28 DIAGNOSIS — K42.9 UMBILICAL HERNIA WITHOUT OBSTRUCTION AND WITHOUT GANGRENE: Primary | ICD-10-CM

## 2024-01-02 DIAGNOSIS — K21.9 GASTROESOPHAGEAL REFLUX DISEASE, UNSPECIFIED WHETHER ESOPHAGITIS PRESENT: ICD-10-CM

## 2024-01-03 ENCOUNTER — TELEPHONE (OUTPATIENT)
Dept: SURGERY | Age: 59
End: 2024-01-03

## 2024-01-03 NOTE — TELEPHONE ENCOUNTER
Called and spoke w/ pt - informed that his CT is scheduled for 1/6/24 A Main - NPO 4 hours prior to procedure - Pt stated he wants to wait to sched his DV Umb Hernia Repair for sometime in February but has to wait a couple weeks to schedule it due to his work schedule - Pt said he wcb to sched later this month

## 2024-01-04 RX ORDER — PANTOPRAZOLE SODIUM 40 MG/1
40 TABLET, DELAYED RELEASE ORAL
Qty: 90 TABLET | Refills: 1 | Status: SHIPPED | OUTPATIENT
Start: 2024-01-04

## 2024-01-06 ENCOUNTER — HOSPITAL ENCOUNTER (OUTPATIENT)
Dept: CT IMAGING | Age: 59
Discharge: HOME | End: 2024-01-06
Attending: SURGERY
Payer: COMMERCIAL

## 2024-01-06 DIAGNOSIS — K42.9 UMBILICAL HERNIA WITHOUT OBSTRUCTION AND WITHOUT GANGRENE: ICD-10-CM

## 2024-01-06 LAB
PERFORMED ON: ABNORMAL
POC CREATININE: 0.8 MG/DL (ref 0.9–1.3)
POC SAMPLE TYPE: ABNORMAL

## 2024-01-06 PROCEDURE — 6360000004 HC RX CONTRAST MEDICATION: Performed by: SURGERY

## 2024-01-06 PROCEDURE — 82565 ASSAY OF CREATININE: CPT

## 2024-01-06 PROCEDURE — 74177 CT ABD & PELVIS W/CONTRAST: CPT

## 2024-01-06 RX ADMIN — IOPAMIDOL 75 ML: 755 INJECTION, SOLUTION INTRAVENOUS at 09:28

## 2024-02-14 ENCOUNTER — OFFICE VISIT (OUTPATIENT)
Dept: URGENT CARE | Age: 59
End: 2024-02-14

## 2024-02-14 VITALS
HEART RATE: 61 BPM | TEMPERATURE: 97.7 F | HEIGHT: 70 IN | WEIGHT: 280 LBS | SYSTOLIC BLOOD PRESSURE: 121 MMHG | DIASTOLIC BLOOD PRESSURE: 78 MMHG | OXYGEN SATURATION: 96 % | BODY MASS INDEX: 40.09 KG/M2

## 2024-02-14 DIAGNOSIS — J01.10 ACUTE NON-RECURRENT FRONTAL SINUSITIS: Primary | ICD-10-CM

## 2024-02-14 RX ORDER — AMOXICILLIN AND CLAVULANATE POTASSIUM 875; 125 MG/1; MG/1
1 TABLET, FILM COATED ORAL 2 TIMES DAILY
Qty: 14 TABLET | Refills: 0 | Status: SHIPPED | OUTPATIENT
Start: 2024-02-14 | End: 2024-02-21

## 2024-02-14 RX ORDER — ESOMEPRAZOLE MAGNESIUM 20 MG/1
20 GRANULE, DELAYED RELEASE ORAL DAILY
COMMUNITY

## 2024-02-14 NOTE — PROGRESS NOTES
Alexander Harmon (: 1965) is a 58 y.o. male, Established patient, here for evaluation of the following chief complaint(s):  Sinus Problem, Nasal Congestion, and Pharyngitis (Almost a week ago started to have head cold symptoms including sinus congestion and sore throat.  Coughing up a lot of phlegm this morning, no chest congestion, just seems to be coming from sinus drainage.  No exposure to anything, no one in his house is sick.  States he's only had strep once 40 years ago.  )      ASSESSMENT/PLAN:    ICD-10-CM    1. Acute non-recurrent frontal sinusitis  J01.10 amoxicillin-clavulanate (AUGMENTIN) 875-125 MG per tablet          - Pt did not want any testing done. Low concern for strep pharyngitis, otitis media, bacterial pneumonia, bronchitis or sepsis.   - Pt to drink lots of fluids  - Pt to take all of the antibiotic prescribed  - Pt ok to take tylenol and ibuprofen as needed  - Pt to call if any symptoms worsen or follow up with PCP  - Pt to go to ER if have shortness of breath or chest pain    Follow up with your PCP or return to the clinic in 5 days if symptoms persist or if symptoms worsen.  The patient tolerated their visit well. The patient and/or the family were informed of the results of any tests, a time was given to answer questions, a plan was proposed and they agreed with plan. Reviewed AVS with treatment instructions and answered questions - pt/family expresses understanding and agreement with the discussed treatment plan and AVS instructions.    SUBJECTIVE/OBJECTIVE:  HPI:   58 y.o. male presents for complaint of 7 days he started with a sore throat, nasal congestion and cough. The congestion seems to be getting worse.     Also admits symptoms of ear pain.   Denies symptoms of fever, body aches, chills, headache, abdominal pain, vomiting or diarrhea.     Has taken tylenol and decongeatant at home but it [place] snot seem to be helping. No known sick contacts.      History provided by:

## 2024-02-23 DIAGNOSIS — I10 ESSENTIAL HYPERTENSION: ICD-10-CM

## 2024-02-23 RX ORDER — LISINOPRIL AND HYDROCHLOROTHIAZIDE 20; 12.5 MG/1; MG/1
1 TABLET ORAL DAILY
Qty: 90 TABLET | Refills: 1 | Status: SHIPPED | OUTPATIENT
Start: 2024-02-23

## 2024-02-23 NOTE — TELEPHONE ENCOUNTER
Refill Request       Last Seen: Last Seen Department: 9/5/2023  Last Seen by PCP: 9/5/2023    Last Written: 7/31/23 90 with 1    Next Appointment:   Future Appointments   Date Time Provider Department Center   3/5/2024  8:00 AM Yu Ryan MD [facility]         Requested Prescriptions     Pending Prescriptions Disp Refills    lisinopril-hydroCHLOROthiazide (PRINZIDE;ZESTORETIC) 20-12.5 MG per tablet 90 tablet 1     Sig: Take 1 tablet by mouth daily

## 2024-03-05 ENCOUNTER — OFFICE VISIT (OUTPATIENT)
Dept: PRIMARY CARE CLINIC | Age: 59
End: 2024-03-05
Payer: COMMERCIAL

## 2024-03-05 VITALS
OXYGEN SATURATION: 97 % | BODY MASS INDEX: 41.52 KG/M2 | WEIGHT: 290 LBS | SYSTOLIC BLOOD PRESSURE: 116 MMHG | DIASTOLIC BLOOD PRESSURE: 70 MMHG | HEIGHT: 70 IN | HEART RATE: 59 BPM | TEMPERATURE: 98 F

## 2024-03-05 DIAGNOSIS — E66.01 CLASS 3 SEVERE OBESITY DUE TO EXCESS CALORIES WITH BODY MASS INDEX (BMI) OF 45.0 TO 49.9 IN ADULT, UNSPECIFIED WHETHER SERIOUS COMORBIDITY PRESENT (HCC): ICD-10-CM

## 2024-03-05 DIAGNOSIS — M47.816 OSTEOARTHRITIS OF LUMBAR SPINE, UNSPECIFIED SPINAL OSTEOARTHRITIS COMPLICATION STATUS: ICD-10-CM

## 2024-03-05 DIAGNOSIS — I10 ESSENTIAL HYPERTENSION: Primary | ICD-10-CM

## 2024-03-05 DIAGNOSIS — D03.59 MELANOMA IN SITU OF BACK (HCC): ICD-10-CM

## 2024-03-05 DIAGNOSIS — K21.9 GASTROESOPHAGEAL REFLUX DISEASE WITHOUT ESOPHAGITIS: ICD-10-CM

## 2024-03-05 PROBLEM — G89.29 CHRONIC LOW BACK PAIN WITHOUT SCIATICA: Status: RESOLVED | Noted: 2021-11-03 | Resolved: 2024-03-05

## 2024-03-05 PROBLEM — K64.4 EXTERNAL HEMORRHOID: Status: RESOLVED | Noted: 2019-02-06 | Resolved: 2024-03-05

## 2024-03-05 PROBLEM — M54.50 CHRONIC LOW BACK PAIN WITHOUT SCIATICA: Status: RESOLVED | Noted: 2021-11-03 | Resolved: 2024-03-05

## 2024-03-05 PROCEDURE — 3078F DIAST BP <80 MM HG: CPT | Performed by: STUDENT IN AN ORGANIZED HEALTH CARE EDUCATION/TRAINING PROGRAM

## 2024-03-05 PROCEDURE — 3074F SYST BP LT 130 MM HG: CPT | Performed by: STUDENT IN AN ORGANIZED HEALTH CARE EDUCATION/TRAINING PROGRAM

## 2024-03-05 PROCEDURE — 99214 OFFICE O/P EST MOD 30 MIN: CPT | Performed by: STUDENT IN AN ORGANIZED HEALTH CARE EDUCATION/TRAINING PROGRAM

## 2024-03-05 RX ORDER — LISINOPRIL AND HYDROCHLOROTHIAZIDE 20; 12.5 MG/1; MG/1
1 TABLET ORAL DAILY
Qty: 90 TABLET | Refills: 3 | Status: SHIPPED | OUTPATIENT
Start: 2024-03-05

## 2024-03-05 ASSESSMENT — ANXIETY QUESTIONNAIRES
GAD7 TOTAL SCORE: 0
6. BECOMING EASILY ANNOYED OR IRRITABLE: 0
1. FEELING NERVOUS, ANXIOUS, OR ON EDGE: 0
7. FEELING AFRAID AS IF SOMETHING AWFUL MIGHT HAPPEN: 0
4. TROUBLE RELAXING: 0
5. BEING SO RESTLESS THAT IT IS HARD TO SIT STILL: 0
3. WORRYING TOO MUCH ABOUT DIFFERENT THINGS: 0
2. NOT BEING ABLE TO STOP OR CONTROL WORRYING: 0

## 2024-03-05 ASSESSMENT — PATIENT HEALTH QUESTIONNAIRE - PHQ9
9. THOUGHTS THAT YOU WOULD BE BETTER OFF DEAD, OR OF HURTING YOURSELF: 0
1. LITTLE INTEREST OR PLEASURE IN DOING THINGS: 0
7. TROUBLE CONCENTRATING ON THINGS, SUCH AS READING THE NEWSPAPER OR WATCHING TELEVISION: 0
2. FEELING DOWN, DEPRESSED OR HOPELESS: 0
SUM OF ALL RESPONSES TO PHQ QUESTIONS 1-9: 0
8. MOVING OR SPEAKING SO SLOWLY THAT OTHER PEOPLE COULD HAVE NOTICED. OR THE OPPOSITE, BEING SO FIGETY OR RESTLESS THAT YOU HAVE BEEN MOVING AROUND A LOT MORE THAN USUAL: 0
SUM OF ALL RESPONSES TO PHQ9 QUESTIONS 1 & 2: 0
SUM OF ALL RESPONSES TO PHQ QUESTIONS 1-9: 0
SUM OF ALL RESPONSES TO PHQ QUESTIONS 1-9: 0
5. POOR APPETITE OR OVEREATING: 0
3. TROUBLE FALLING OR STAYING ASLEEP: 0
SUM OF ALL RESPONSES TO PHQ QUESTIONS 1-9: 0
10. IF YOU CHECKED OFF ANY PROBLEMS, HOW DIFFICULT HAVE THESE PROBLEMS MADE IT FOR YOU TO DO YOUR WORK, TAKE CARE OF THINGS AT HOME, OR GET ALONG WITH OTHER PEOPLE: 0
6. FEELING BAD ABOUT YOURSELF - OR THAT YOU ARE A FAILURE OR HAVE LET YOURSELF OR YOUR FAMILY DOWN: 0
4. FEELING TIRED OR HAVING LITTLE ENERGY: 0

## 2024-03-05 NOTE — PROGRESS NOTES
sounds: No murmur heard.  Pulmonary:      Effort: Pulmonary effort is normal.      Breath sounds: Normal breath sounds. No wheezing.   Abdominal:      General: Bowel sounds are normal. There is no distension.      Palpations: Abdomen is soft.      Tenderness: There is no rebound.   Musculoskeletal:         General: Normal range of motion.      Cervical back: Normal range of motion.   Skin:     General: Skin is warm and dry.      Capillary Refill: Capillary refill takes 2 to 3 seconds.   Neurological:      General: No focal deficit present.      Mental Status: He is alert.   Psychiatric:         Mood and Affect: Mood normal.         Plan  1. Essential hypertension  -     lisinopril-hydroCHLOROthiazide (PRINZIDE;ZESTORETIC) 20-12.5 MG per tablet; Take 1 tablet by mouth daily, Disp-90 tablet, R-3Normal  - Chronic, stable, continue current medication regimen   2. Melanoma in situ of back (HCC)   - in remission for several years, cont to follow with derm  3. Class 3 severe obesity due to excess calories with body mass index (BMI) of 45.0 to 49.9 in adult, unspecified whether serious comorbidity present (HCC)   - chronic, not at goal, cont diet and exercise  4. Gastroesophageal reflux disease without esophagitis   - Chronic, stable, continue current medication regimen, otc PPI  5. Osteoarthritis of lumbar spine, unspecified spinal osteoarthritis complication status   Chronic, stable, continue current medication regimen, mobic 7.5 mg daily        While assessing care for this patient, I have reviewed all pertinent lab work/imaging/ specialist notes and care in reference to those problems addressed above in detail. Appropriate medical decision making was based on this.     Please note that portions of this note may have been completed with a voice recognition program. Efforts were made to edit the dictations but occasionally words are mis-transcribed.    Return in about 6 months (around 9/5/2024) for Physical.    Yu

## 2024-03-29 ENCOUNTER — OFFICE VISIT (OUTPATIENT)
Dept: URGENT CARE | Age: 59
End: 2024-03-29

## 2024-03-29 VITALS
TEMPERATURE: 97.9 F | HEIGHT: 70 IN | BODY MASS INDEX: 41.57 KG/M2 | DIASTOLIC BLOOD PRESSURE: 73 MMHG | HEART RATE: 58 BPM | OXYGEN SATURATION: 97 % | WEIGHT: 290.4 LBS | SYSTOLIC BLOOD PRESSURE: 121 MMHG

## 2024-03-29 DIAGNOSIS — L25.9 CONTACT DERMATITIS, UNSPECIFIED CONTACT DERMATITIS TYPE, UNSPECIFIED TRIGGER: Primary | ICD-10-CM

## 2024-03-29 RX ORDER — METHYLPREDNISOLONE 4 MG/1
TABLET ORAL
Qty: 1 KIT | Refills: 0 | Status: SHIPPED | OUTPATIENT
Start: 2024-03-29 | End: 2024-04-04

## 2024-03-29 NOTE — PROGRESS NOTES
Alexander Harmon (:  1965) is a 58 y.o. male,Established patient, here for evaluation of the following chief complaint(s):  Rash (On Wednesday patient noticed a rash on his R arm, applied hydrocortisone cream. Developed itchiness today + appears to be spreading. )      ASSESSMENT/PLAN:    ICD-10-CM    1. Contact dermatitis, unspecified contact dermatitis type, unspecified trigger  L25.9 methylPREDNISolone (MEDROL DOSEPACK) 4 MG tablet          Does not appear to be eczema or psoriasis or fungal dermatitis    Keep hydrated,    Return sooner or go to the ER if symptoms worse/feeling worse or has new symptoms or concerns  Follow up in 7-10 days if not better   If prescribed medication : take by mouth /or apply locally..use as directed  Return any time if area looking/feeling worse,  if fever, or has increase pain, increase redness, swelling, or drainage noted....if spreading or any changes as to it's appearance is noted      Discussed applying cold compress, taking zyrtec for itching  Make sure no increase reflux or stomach pains when taking oral steroid, stop meloxicam  while taking steroid         SUBJECTIVE/OBJECTIVE:  Patient presents with:  Rash: On Wednesday patient noticed a rash on his R arm, applied hydrocortisone cream. Developed itchiness today + appears to be spreading.   No fever, no known exposure         History provided by:  Patient   used: No        Vitals:    24 1721   BP: 121/73   Site: Left Upper Arm   Position: Sitting   Cuff Size: Large Adult   Pulse: 58   Temp: 97.9 °F (36.6 °C)   TempSrc: Oral   SpO2: 97%   Weight: 131.7 kg (290 lb 6.4 oz)   Height: 1.778 m (5' 10\")       Review of Systems   Constitutional:  Negative for fever.   HENT:  Negative for sore throat.    Musculoskeletal:  Negative for arthralgias and myalgias.   Skin:  Positive for rash.       Physical Exam    Physical  Vitals signs: reviewed  Constitutional:  appearance: well nourished ..  does not

## 2024-03-30 ASSESSMENT — ENCOUNTER SYMPTOMS: SORE THROAT: 0

## 2024-06-22 ENCOUNTER — HOSPITAL ENCOUNTER (EMERGENCY)
Age: 59
Discharge: HOME | End: 2024-06-22
Attending: EMERGENCY MEDICINE
Payer: COMMERCIAL

## 2024-06-22 ENCOUNTER — APPOINTMENT (OUTPATIENT)
Dept: GENERAL RADIOLOGY | Age: 59
End: 2024-06-22
Payer: COMMERCIAL

## 2024-06-22 VITALS
DIASTOLIC BLOOD PRESSURE: 79 MMHG | TEMPERATURE: 98.1 F | WEIGHT: 293 LBS | HEIGHT: 70 IN | HEART RATE: 56 BPM | SYSTOLIC BLOOD PRESSURE: 146 MMHG | OXYGEN SATURATION: 96 % | RESPIRATION RATE: 18 BRPM | BODY MASS INDEX: 41.95 KG/M2

## 2024-06-22 DIAGNOSIS — R07.9 CHEST PAIN, UNSPECIFIED TYPE: Primary | ICD-10-CM

## 2024-06-22 LAB
ALBUMIN SERPL-MCNC: 4.6 G/DL (ref 3.4–5)
ALBUMIN/GLOB SERPL: 1.3 {RATIO} (ref 1.1–2.2)
ALP SERPL-CCNC: 77 U/L (ref 40–129)
ALT SERPL-CCNC: 16 U/L (ref 10–40)
ANION GAP SERPL CALCULATED.3IONS-SCNC: 9 MMOL/L (ref 3–16)
AST SERPL-CCNC: 19 U/L (ref 15–37)
BASOPHILS # BLD: 0.1 K/UL (ref 0–0.2)
BASOPHILS NFR BLD: 2.5 %
BILIRUB SERPL-MCNC: 0.7 MG/DL (ref 0–1)
BUN SERPL-MCNC: 20 MG/DL (ref 7–20)
CALCIUM SERPL-MCNC: 9.6 MG/DL (ref 8.3–10.6)
CHLORIDE SERPL-SCNC: 99 MMOL/L (ref 99–110)
CO2 SERPL-SCNC: 26 MMOL/L (ref 21–32)
CREAT SERPL-MCNC: 0.9 MG/DL (ref 0.9–1.3)
DEPRECATED RDW RBC AUTO: 14.2 % (ref 12.4–15.4)
EKG ATRIAL RATE: 49 BPM
EKG DIAGNOSIS: NORMAL
EKG P AXIS: 72 DEGREES
EKG P-R INTERVAL: 200 MS
EKG Q-T INTERVAL: 424 MS
EKG QRS DURATION: 78 MS
EKG QTC CALCULATION (BAZETT): 383 MS
EKG R AXIS: 35 DEGREES
EKG T AXIS: 69 DEGREES
EKG VENTRICULAR RATE: 49 BPM
EOSINOPHIL # BLD: 0.1 K/UL (ref 0–0.6)
EOSINOPHIL NFR BLD: 1.7 %
GFR SERPLBLD CREATININE-BSD FMLA CKD-EPI: >90 ML/MIN/{1.73_M2}
GLUCOSE SERPL-MCNC: 103 MG/DL (ref 70–99)
HCT VFR BLD AUTO: 44.1 % (ref 40.5–52.5)
HGB BLD-MCNC: 15 G/DL (ref 13.5–17.5)
LYMPHOCYTES # BLD: 1.8 K/UL (ref 1–5.1)
LYMPHOCYTES NFR BLD: 38.7 %
MCH RBC QN AUTO: 30.2 PG (ref 26–34)
MCHC RBC AUTO-ENTMCNC: 34.1 G/DL (ref 31–36)
MCV RBC AUTO: 88.5 FL (ref 80–100)
MONOCYTES # BLD: 0.3 K/UL (ref 0–1.3)
MONOCYTES NFR BLD: 6.9 %
NEUTROPHILS # BLD: 2.3 K/UL (ref 1.7–7.7)
NEUTROPHILS NFR BLD: 50.2 %
PLATELET # BLD AUTO: 222 K/UL (ref 135–450)
PMV BLD AUTO: 7.1 FL (ref 5–10.5)
POTASSIUM SERPL-SCNC: 4.1 MMOL/L (ref 3.5–5.1)
PROT SERPL-MCNC: 8.1 G/DL (ref 6.4–8.2)
RBC # BLD AUTO: 4.98 M/UL (ref 4.2–5.9)
SODIUM SERPL-SCNC: 134 MMOL/L (ref 136–145)
TROPONIN, HIGH SENSITIVITY: 7 NG/L (ref 0–22)
TROPONIN, HIGH SENSITIVITY: 7 NG/L (ref 0–22)
WBC # BLD AUTO: 4.6 K/UL (ref 4–11)

## 2024-06-22 PROCEDURE — 93010 ELECTROCARDIOGRAM REPORT: CPT | Performed by: INTERNAL MEDICINE

## 2024-06-22 PROCEDURE — 71046 X-RAY EXAM CHEST 2 VIEWS: CPT

## 2024-06-22 PROCEDURE — 93005 ELECTROCARDIOGRAM TRACING: CPT | Performed by: EMERGENCY MEDICINE

## 2024-06-22 PROCEDURE — 36415 COLL VENOUS BLD VENIPUNCTURE: CPT

## 2024-06-22 PROCEDURE — 99285 EMERGENCY DEPT VISIT HI MDM: CPT

## 2024-06-22 PROCEDURE — 80053 COMPREHEN METABOLIC PANEL: CPT

## 2024-06-22 PROCEDURE — 84484 ASSAY OF TROPONIN QUANT: CPT

## 2024-06-22 PROCEDURE — 85025 COMPLETE CBC W/AUTO DIFF WBC: CPT

## 2024-06-22 RX ORDER — METHOCARBAMOL 750 MG/1
750 TABLET, FILM COATED ORAL 4 TIMES DAILY
Qty: 40 TABLET | Refills: 0 | Status: SHIPPED | OUTPATIENT
Start: 2024-06-22 | End: 2024-07-02

## 2024-06-22 RX ORDER — IBUPROFEN 800 MG/1
800 TABLET ORAL ONCE
Status: DISCONTINUED | OUTPATIENT
Start: 2024-06-22 | End: 2024-06-22

## 2024-06-22 RX ORDER — LIDOCAINE 50 MG/G
1 PATCH TOPICAL DAILY
Qty: 10 PATCH | Refills: 0 | Status: SHIPPED | OUTPATIENT
Start: 2024-06-22 | End: 2024-07-02

## 2024-06-22 ASSESSMENT — PAIN DESCRIPTION - LOCATION: LOCATION: CHEST

## 2024-06-22 ASSESSMENT — PAIN - FUNCTIONAL ASSESSMENT: PAIN_FUNCTIONAL_ASSESSMENT: 0-10

## 2024-06-22 ASSESSMENT — HEART SCORE: ECG: NORMAL

## 2024-06-22 ASSESSMENT — PAIN SCALES - GENERAL: PAINLEVEL_OUTOF10: 1

## 2024-06-22 ASSESSMENT — PAIN DESCRIPTION - ORIENTATION: ORIENTATION: UPPER;RIGHT;LEFT

## 2024-06-22 NOTE — ED PROVIDER NOTES
Emergency Department Provider Note  Location: [facility]  ED  6/22/2024     Patient Identification  Alexander Harmon is a 59 y.o. 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  Patient reports he gets cramps sensation in his right anterior chest just right of the sternum and seems to be when he holds his arms forward particularly when he is typing.  Feels like a muscle spasming on his chest wall.  It last for few minutes and seems to get better if he relaxes his shoulders and externally rotates his shoulders opening up his chest.  Does not radiate.  It is never occurred with exertion and is not associated with any diaphoresis or shortness of breath or pleurisy.  Denies any trauma.  No numbness or weakness no back pain.  Spoke with his wife who insisted he come to the emergency room to get it checked out.      Nursing Notes were all reviewed and agreed with, or any disagreements were addressed in the HPI:  Allergies:   Allergies   Allergen Reactions    Latex Rash     Redness-sensitive    Oxycodone-Acetaminophen Rash       Past medical history:  has a past medical history of Acid reflux, Anemia (04/27/2021), Anesthesia complication, Body mass index (BMI) of 40.0 to 44.9 in adult (HCC) (09/28/2017), Cancer (HCC), Chronic right-sided thoracic back pain (05/22/2019), External hemorrhoid (02/06/2019), Headache, Hearing loss (?), colonic polyps (10/28/2019), melanoma excision (09/27/2017), Hypertension, Positive FIT (fecal immunochemical test) (10/28/2019), Prediabetes (05/22/2019), Pulmonary nodules (10/28/2019), Sleep  scripts for the following medications. I counseled patient how to take these medications.   Discharge Medication List as of 6/22/2024  4:50 PM        START taking these medications    Details   methocarbamol (ROBAXIN-750) 750 MG tablet Take 1 tablet by mouth 4 times daily for 10 days, Disp-40 tablet, R-0Normal      lidocaine (LIDODERM) 5 % Place 1 patch onto the skin daily for 10 days 12 hours on, 12 hours off., Disp-10 patch, R-0Normal           Discharge Medication List as of 6/22/2024  4:50 PM          Disposition referral (if applicable):  Yu Ryan MD  [address]  [address]  [facility] [address]  [phone]    Schedule an appointment as soon as possible for a visit       [facility]  ED  [address] [facility] [id number] [phone]  Go to   As needed    Ashlyn Macdonald MD  [address] [facility] [address]  [facility] [id number] [phone]    Schedule an appointment as soon as possible for a visit         Robin RESENDEZ am the primary attending of record and contributed the majority of evaluation and treatment of emergent care for this encounter.     Total critical care time is 0 minutes, which excludes separately billable procedures and updating family. Time spent is specifically for management of the presenting complaint and symptoms initially, direct bedside care, reevaluation, review of records, and consultation.  There was a high probability of clinically significant life-threatening deterioration in the patient's condition, which required my urgent intervention.     This chart was generated in part by using Dragon Dictation system and may contain errors related to that system including errors in grammar, punctuation, and spelling, as well as words and phrases that may be inappropriate. If there are any questions or concerns please feel free to contact the dictating provider for clarification.     Robin Earl MD   [employer]        Robin Earl

## 2024-07-10 ENCOUNTER — OFFICE VISIT (OUTPATIENT)
Dept: PRIMARY CARE CLINIC | Age: 59
End: 2024-07-10

## 2024-07-10 VITALS
BODY MASS INDEX: 43.19 KG/M2 | OXYGEN SATURATION: 96 % | HEART RATE: 68 BPM | SYSTOLIC BLOOD PRESSURE: 122 MMHG | WEIGHT: 301 LBS | DIASTOLIC BLOOD PRESSURE: 78 MMHG

## 2024-07-10 DIAGNOSIS — K21.9 GASTROESOPHAGEAL REFLUX DISEASE WITHOUT ESOPHAGITIS: ICD-10-CM

## 2024-07-10 DIAGNOSIS — I10 ESSENTIAL HYPERTENSION: ICD-10-CM

## 2024-07-10 DIAGNOSIS — R07.9 CHEST PAIN, UNSPECIFIED TYPE: Primary | ICD-10-CM

## 2024-07-10 SDOH — ECONOMIC STABILITY: INCOME INSECURITY: HOW HARD IS IT FOR YOU TO PAY FOR THE VERY BASICS LIKE FOOD, HOUSING, MEDICAL CARE, AND HEATING?: PATIENT DECLINED

## 2024-07-10 SDOH — ECONOMIC STABILITY: FOOD INSECURITY: WITHIN THE PAST 12 MONTHS, YOU WORRIED THAT YOUR FOOD WOULD RUN OUT BEFORE YOU GOT MONEY TO BUY MORE.: PATIENT DECLINED

## 2024-07-10 SDOH — ECONOMIC STABILITY: HOUSING INSECURITY
IN THE LAST 12 MONTHS, WAS THERE A TIME WHEN YOU DID NOT HAVE A STEADY PLACE TO SLEEP OR SLEPT IN A SHELTER (INCLUDING NOW)?: PATIENT DECLINED

## 2024-07-10 SDOH — ECONOMIC STABILITY: FOOD INSECURITY: WITHIN THE PAST 12 MONTHS, THE FOOD YOU BOUGHT JUST DIDN'T LAST AND YOU DIDN'T HAVE MONEY TO GET MORE.: PATIENT DECLINED

## 2024-07-10 NOTE — PROGRESS NOTES
PROGRESS NOTE   [facility]                                  [address], [address], [facility] [address]         Phone: [phone]    Date of Service:  7/10/2024     Patient ID: José Miguel Harmon is a 59 y.o. male      Subjective:     CC: Hospital follow- up    HPI  Patient, a 59-year-old male with a history of obesity, hypertension, and sleep apnea, employed as a jail /, presents today after a hospital follow up.  He went to the ER on 6/22 due to right anterior chest pain,  intermittent over the week prior but worsened significantly. He described a cramping sensation in his right anterior chest, just to the right of the sternum, especially noticeable when he held his arms forward, such as during typing. The pain felt like a muscle spasm on his chest wall, lasting a few minutes and easing when he relaxed his shoulders and externally rotated them to open up his chest. He denied any radiation of pain, onset with exertion, sweating, shortness of breath, or pleuritic symptoms. He also reported no recent trauma, numbness, weakness, or back pain. His wife had encouraged him to seek medical evaluation due to the persistence of symptoms.    The results from the ER encounter on 06/22/24 show normal CBC and CMP. Troponin levels were normal. The 12-lead EKG showed sinus bradycardia with otherwise normal parameters, consistent with the patient's chronic conditions and previous vitals in the office. He was prescribed a muscle relaxer and a patch. He used it four times a day and did not notice any difference initially. Patient stated by day 3 after his ER visit his pain was gone.     Patient recent stressors, dealing with mother and caring for her and handicap brother. He is a  and lifts heavy things.     Patient currently taking Lisinopril-HCTZ for HTN, Meloixcam for back pain,Vit D, and Esomeprazole for

## 2024-08-26 DIAGNOSIS — I10 ESSENTIAL HYPERTENSION: ICD-10-CM

## 2024-08-26 NOTE — TELEPHONE ENCOUNTER
Refill Request       Last Seen: Last Seen Department: 7/10/2024  Last Seen by PCP: 3/5/2024    Last Written: 3/5/24 90 with 3    Next Appointment:   Future Appointments   Date Time Provider Department Center   9/17/2024  2:00 PM Yu Ryan MD [facility] DEP     Requested Prescriptions     Pending Prescriptions Disp Refills    lisinopril-hydroCHLOROthiazide (PRINZIDE;ZESTORETIC) 20-12.5 MG per tablet 90 tablet 3     Sig: Take 1 tablet by mouth daily

## 2024-08-27 RX ORDER — LISINOPRIL AND HYDROCHLOROTHIAZIDE 12.5; 2 MG/1; MG/1
1 TABLET ORAL DAILY
Qty: 90 TABLET | Refills: 3 | Status: SHIPPED | OUTPATIENT
Start: 2024-08-27

## 2024-10-07 ENCOUNTER — HOSPITAL ENCOUNTER (OUTPATIENT)
Age: 59
Discharge: HOME | End: 2024-10-07
Payer: COMMERCIAL

## 2024-10-07 ENCOUNTER — OFFICE VISIT (OUTPATIENT)
Dept: PRIMARY CARE CLINIC | Age: 59
End: 2024-10-07
Payer: COMMERCIAL

## 2024-10-07 VITALS
WEIGHT: 310 LBS | OXYGEN SATURATION: 97 % | HEART RATE: 49 BPM | BODY MASS INDEX: 44.48 KG/M2 | SYSTOLIC BLOOD PRESSURE: 130 MMHG | TEMPERATURE: 97.8 F | DIASTOLIC BLOOD PRESSURE: 82 MMHG

## 2024-10-07 DIAGNOSIS — G47.33 OBSTRUCTIVE SLEEP APNEA SYNDROME: ICD-10-CM

## 2024-10-07 DIAGNOSIS — E66.813 CLASS 3 SEVERE OBESITY DUE TO EXCESS CALORIES WITH BODY MASS INDEX (BMI) OF 45.0 TO 49.9 IN ADULT, UNSPECIFIED WHETHER SERIOUS COMORBIDITY PRESENT: ICD-10-CM

## 2024-10-07 DIAGNOSIS — R91.8 PULMONARY NODULES: ICD-10-CM

## 2024-10-07 DIAGNOSIS — M47.816 OSTEOARTHRITIS OF LUMBAR SPINE, UNSPECIFIED SPINAL OSTEOARTHRITIS COMPLICATION STATUS: ICD-10-CM

## 2024-10-07 DIAGNOSIS — M54.50 CHRONIC BILATERAL LOW BACK PAIN WITHOUT SCIATICA: ICD-10-CM

## 2024-10-07 DIAGNOSIS — G89.29 CHRONIC BILATERAL LOW BACK PAIN WITHOUT SCIATICA: ICD-10-CM

## 2024-10-07 DIAGNOSIS — E66.01 CLASS 3 SEVERE OBESITY DUE TO EXCESS CALORIES WITH BODY MASS INDEX (BMI) OF 45.0 TO 49.9 IN ADULT, UNSPECIFIED WHETHER SERIOUS COMORBIDITY PRESENT: ICD-10-CM

## 2024-10-07 DIAGNOSIS — I10 ESSENTIAL HYPERTENSION: ICD-10-CM

## 2024-10-07 DIAGNOSIS — I10 ESSENTIAL HYPERTENSION: Primary | ICD-10-CM

## 2024-10-07 LAB
CHOLEST SERPL-MCNC: 131 MG/DL (ref 0–199)
HDLC SERPL-MCNC: 46 MG/DL (ref 40–60)
LDL CHOLESTEROL: 72 MG/DL
TRIGL SERPL-MCNC: 66 MG/DL (ref 0–150)
VLDLC SERPL CALC-MCNC: 13 MG/DL

## 2024-10-07 PROCEDURE — 80061 LIPID PANEL: CPT

## 2024-10-07 PROCEDURE — 3075F SYST BP GE 130 - 139MM HG: CPT | Performed by: STUDENT IN AN ORGANIZED HEALTH CARE EDUCATION/TRAINING PROGRAM

## 2024-10-07 PROCEDURE — 36415 COLL VENOUS BLD VENIPUNCTURE: CPT

## 2024-10-07 PROCEDURE — 3079F DIAST BP 80-89 MM HG: CPT | Performed by: STUDENT IN AN ORGANIZED HEALTH CARE EDUCATION/TRAINING PROGRAM

## 2024-10-07 PROCEDURE — 83036 HEMOGLOBIN GLYCOSYLATED A1C: CPT

## 2024-10-07 PROCEDURE — 99214 OFFICE O/P EST MOD 30 MIN: CPT | Performed by: STUDENT IN AN ORGANIZED HEALTH CARE EDUCATION/TRAINING PROGRAM

## 2024-10-07 RX ORDER — MELOXICAM 7.5 MG/1
TABLET ORAL
Qty: 60 TABLET | Refills: 4 | Status: SHIPPED | OUTPATIENT
Start: 2024-10-07

## 2024-10-07 NOTE — PROGRESS NOTES
Cardiovascular:      Rate and Rhythm: Normal rate and regular rhythm.      Pulses: Normal pulses.      Heart sounds: No murmur heard.  Pulmonary:      Effort: Pulmonary effort is normal.      Breath sounds: Normal breath sounds. No wheezing.   Abdominal:      General: There is no distension.      Tenderness: There is no rebound.   Musculoskeletal:         General: Normal range of motion.      Cervical back: Normal range of motion.   Skin:     General: Skin is warm and dry.      Capillary Refill: Capillary refill takes 2 to 3 seconds.   Neurological:      General: No focal deficit present.      Mental Status: He is alert.   Psychiatric:         Mood and Affect: Mood normal.         Plan  1. Essential hypertension  -     Hemoglobin A1C; Future  - at goal, no changes, cont current meds  2. Chronic bilateral low back pain without sciatica  -     meloxicam (MOBIC) 7.5 MG tablet; TAKE 1 TO 2 TABLETS BY MOUTH DAILY WITH FOOD AS NEEDED FOR BACK PAIN, Disp-60 tablet, R-4Normal  - chronic, most likely muscle related, controlled with mobic and when he does not push himself too hard at work. No changes  3. Osteoarthritis of lumbar spine, unspecified spinal osteoarthritis complication status  4. Class 3 severe obesity due to excess calories with body mass index (BMI) of 45.0 to 49.9 in adult, unspecified whether serious comorbidity present  -     Hemoglobin A1C; Future  -     Lipid, Fasting; Future  - not at goal, discussed healthy lifestyle changes, pt is very busy caring for brother          While assessing care for this patient, I have reviewed all pertinent lab work/imaging/ specialist notes and care in reference to those problems addressed above in detail. Appropriate medical decision making was based on this.     Please note that portions of this note may have been completed with a voice recognition program. Efforts were made to edit the dictations but occasionally words are mis-transcribed.    Return in about 6 months

## 2024-10-08 PROBLEM — M19.90 OSTEOARTHRITIS: Status: RESOLVED | Noted: 2020-10-26 | Resolved: 2024-10-08

## 2024-10-08 LAB
EST. AVERAGE GLUCOSE BLD GHB EST-MCNC: 114 MG/DL
HBA1C MFR BLD: 5.6 %

## 2024-12-23 DIAGNOSIS — G56.01 CARPAL TUNNEL SYNDROME OF RIGHT WRIST: Primary | ICD-10-CM

## 2025-01-13 SDOH — HEALTH STABILITY: PHYSICAL HEALTH: ON AVERAGE, HOW MANY MINUTES DO YOU ENGAGE IN EXERCISE AT THIS LEVEL?: 0 MIN

## 2025-01-13 SDOH — HEALTH STABILITY: PHYSICAL HEALTH: ON AVERAGE, HOW MANY DAYS PER WEEK DO YOU ENGAGE IN MODERATE TO STRENUOUS EXERCISE (LIKE A BRISK WALK)?: 0 DAYS

## 2025-01-14 ENCOUNTER — OFFICE VISIT (OUTPATIENT)
Dept: ORTHOPEDIC SURGERY | Age: 60
End: 2025-01-14
Payer: COMMERCIAL

## 2025-01-14 VITALS — WEIGHT: 310 LBS | HEIGHT: 70 IN | BODY MASS INDEX: 44.38 KG/M2

## 2025-01-14 DIAGNOSIS — G56.21 CUBITAL TUNNEL SYNDROME ON RIGHT: ICD-10-CM

## 2025-01-14 DIAGNOSIS — G56.01 CARPAL TUNNEL SYNDROME OF RIGHT WRIST: Primary | ICD-10-CM

## 2025-01-14 PROCEDURE — 99203 OFFICE O/P NEW LOW 30 MIN: CPT | Performed by: STUDENT IN AN ORGANIZED HEALTH CARE EDUCATION/TRAINING PROGRAM

## 2025-01-14 NOTE — PROGRESS NOTES
Hand, Upper Extremity and Reconstructive Surgery                Shavon Robbins MD                                           Summary of Upper Extremity Problems and Interventions     Diagnosis: Eval for right carpal tunnel syndrome    History of Present Illness     Alexander Harmon is a 59 y.o. right hand dominant male who presents with numbness and tingling in his right hand.  Patient reports numbness and tingling in his right hand starting approximately 1.5 years ago.  Patient wore a brace and took steroids and he said the symptoms improved.  Symptoms recurred approximately 2 to 3 months ago.  His symptoms of numbness are predominantly in his small finger, ring finger and thumb.  The symptoms are intermittent.  It is worse with activity.  It improves when he extends his elbow.  Denies nighttime symptoms.  Symptoms include pain and numbness.  He is taking Mobic for back pain already.    Pertinent past medical history:    Diabetes negative,  10/7 A1c 5.6   Thyroid disease negative   Autoimmune disease negative   Positive history of forearm fracture on the right, treated conservatively    Occupation:     Allergies     Allergies   Allergen Reactions    Latex Rash     Redness-sensitive    Oxycodone-Acetaminophen Rash       Home Medications     Current Outpatient Medications   Medication Instructions    Cholecalciferol (VITAMIN D3) 5000 UNITS TABS 1 tablet, Oral, DAILY    esomeprazole Magnesium (NEXIUM) 20 mg, DAILY    lisinopril-hydroCHLOROthiazide (PRINZIDE;ZESTORETIC) 20-12.5 MG per tablet 1 tablet, Oral, DAILY    meloxicam (MOBIC) 7.5 MG tablet TAKE 1 TO 2 TABLETS BY MOUTH DAILY WITH FOOD AS NEEDED FOR BACK PAIN    omeprazole (PRILOSEC) 20 mg, Oral, DAILY       Past Medical History     Past Medical History:   Diagnosis Date    Acid reflux     Anemia 04/27/2021    Anesthesia complication     was told that was combative when coming out of [facility]    Body mass

## 2025-01-18 ENCOUNTER — OFFICE VISIT (OUTPATIENT)
Dept: URGENT CARE | Age: 60
End: 2025-01-18

## 2025-01-18 VITALS
WEIGHT: 315 LBS | SYSTOLIC BLOOD PRESSURE: 132 MMHG | BODY MASS INDEX: 46.92 KG/M2 | DIASTOLIC BLOOD PRESSURE: 74 MMHG | HEART RATE: 61 BPM | OXYGEN SATURATION: 98 % | TEMPERATURE: 97.8 F

## 2025-01-18 DIAGNOSIS — J01.90 ACUTE BACTERIAL SINUSITIS: Primary | ICD-10-CM

## 2025-01-18 DIAGNOSIS — R51.9 ACUTE NONINTRACTABLE HEADACHE, UNSPECIFIED HEADACHE TYPE: ICD-10-CM

## 2025-01-18 DIAGNOSIS — R09.81 NASAL CONGESTION: ICD-10-CM

## 2025-01-18 DIAGNOSIS — R09.82 POSTNASAL DRIP: ICD-10-CM

## 2025-01-18 DIAGNOSIS — B96.89 ACUTE BACTERIAL SINUSITIS: Primary | ICD-10-CM

## 2025-01-18 DIAGNOSIS — R05.1 ACUTE COUGH: ICD-10-CM

## 2025-01-18 RX ORDER — DEXTROMETHORPHAN HBR AND PYRILAMINE MALEATE 30; 30 MG/1; MG/1
1 TABLET ORAL 4 TIMES DAILY PRN
Qty: 40 TABLET | Refills: 0 | Status: SHIPPED | OUTPATIENT
Start: 2025-01-18

## 2025-01-18 RX ORDER — GUAIFENESIN 600 MG/1
600 TABLET, EXTENDED RELEASE ORAL 2 TIMES DAILY
Qty: 20 TABLET | Refills: 0 | Status: SHIPPED | OUTPATIENT
Start: 2025-01-18 | End: 2025-01-28

## 2025-01-18 ASSESSMENT — ENCOUNTER SYMPTOMS
WHEEZING: 0
SORE THROAT: 1
SINUS PRESSURE: 1
SHORTNESS OF BREATH: 0
RHINORRHEA: 1
COUGH: 0
VOICE CHANGE: 0
ABDOMINAL PAIN: 0
VOMITING: 0
DIARRHEA: 0
NAUSEA: 0
SINUS PAIN: 1
CHEST TIGHTNESS: 0

## 2025-01-18 ASSESSMENT — VISUAL ACUITY: OU: 1

## 2025-01-18 NOTE — PROGRESS NOTES
Alexander Harmon (: 1965) is a 59 y.o. male, Established patient, here for evaluation of the following chief complaint(s):  Epistaxis (Consistent nose bleeds after having sinus issues since before juan )      ASSESSMENT/PLAN:    ICD-10-CM    1. Acute bacterial sinusitis  J01.90 amoxicillin-clavulanate (AUGMENTIN) 875-125 MG per tablet    B96.89 Dextromethorphan-Pyrilamine (CAPRON DMT) 30-30 MG TABS     guaiFENesin (MUCINEX) 600 MG extended release tablet      2. Nasal congestion  R09.81 Dextromethorphan-Pyrilamine (CAPRON DMT) 30-30 MG TABS     guaiFENesin (MUCINEX) 600 MG extended release tablet      3. Postnasal drip  R09.82 Dextromethorphan-Pyrilamine (CAPRON DMT) 30-30 MG TABS     guaiFENesin (MUCINEX) 600 MG extended release tablet      4. Acute cough  R05.1 Dextromethorphan-Pyrilamine (CAPRON DMT) 30-30 MG TABS     guaiFENesin (MUCINEX) 600 MG extended release tablet      5. Acute nonintractable headache, unspecified headache type  R51.9           - Acute Bacterial Sinusitis:   Given symptoms for over 3 weeks, with worsening of symptoms including congestion and sinus pressure/pain, and exam findings of sinus congestion, bilateral TM retraction, bilateral maxillary tenderness to palpation, PND, and pharyngeal erythema, there is concern for bacterial sinusitis complicating an underlying viral URI.  Low concern for otitis media, Strep pharyngitis, respiratory distress, pneumonia, bronchitis, and PE.  Augmentin prescribed for antibiotic treatment.  [place] DMT prescribed for cough and congestion relief.  Guaifenesin prescribed for expectorant therapy.  Recommended OTC medication and home remedy treatments for symptomatic relief  Strict ED follow up instructions provided    Discussed PCP follow up for persisting or worsening symptoms, or to return to the clinic if unable to obtain PCP follow up for worsening symptoms.    The patient tolerated their visit well. The patient and/or the family were

## 2025-01-18 NOTE — PATIENT INSTRUCTIONS
Augmentin is prescribed for antibiotic treatment of the sinus infection  Take the antibiotics until completed, do not stop unless otherwise directed by a healthcare provider.  Recommend daily yogurt or other probiotics while on antibiotics.  Recommend using saline nasal sprays to help keep the nasal tissues moist to help prevent further nose bleeds.  [place] DMT prescribed for cough and congestion relief.  Guaifenesin (Mucinex) prescribed for expectorant therapy.    Recommend OTC treatment for symptoms:  acetaminophen (Tylenol) for fevers and pain relief.  antihistamines (Claritin, Zyrtec, Allegra, or Xyzal) and nasal steroid sprays (Flonase) to help with nasal congestion and runny nose.  throat sprays (Cepacol, chloraseptic) for throat pain.  dextromethorphan (Robitussin, Delsym), throat lozenges, and increased water intake for cough.  honey (1-2 teaspoons every hour) for relief of throat irritation and coughing fits.  warm teas, humidifiers, nasal lavages, and sleeping in an inclined position are also helpful options that can lessen symptoms.  Recommend warm compresses over the symptomatic ear(s) for 10-15 minutes, or a hot shower, followed by 1-2 minutes of massaging the area behind your ears and down the jaw-line to help with the ear congestion/ear pressure.    Follow up with your PCP within 5 days or, if unable, you can return to the clinic if symptoms persist beyond 5 days or if symptoms worsen.  If you develop shortness of breath, increased work of breathing, lightheadedness, or chest pain, contact 911, or follow up immediately with the nearest Emergency Department for further evaluation.    New Prescriptions    AMOXICILLIN-CLAVULANATE (AUGMENTIN) 875-125 MG PER TABLET    Take 1 tablet by mouth 2 times daily for 7 days    DEXTROMETHORPHAN-PYRILAMINE (CAPRON DMT) 30-30 MG TABS    Take 1 tablet by mouth 4 times daily as needed (cough and congestion)    GUAIFENESIN (MUCINEX) 600 MG EXTENDED RELEASE TABLET    Take

## 2025-01-29 ENCOUNTER — OFFICE VISIT (OUTPATIENT)
Dept: ORTHOPEDIC SURGERY | Age: 60
End: 2025-01-29
Payer: COMMERCIAL

## 2025-01-29 VITALS — HEIGHT: 70 IN | WEIGHT: 315 LBS | BODY MASS INDEX: 45.1 KG/M2

## 2025-01-29 DIAGNOSIS — G56.01 CARPAL TUNNEL SYNDROME OF RIGHT WRIST: Primary | ICD-10-CM

## 2025-01-29 PROCEDURE — 99213 OFFICE O/P EST LOW 20 MIN: CPT | Performed by: STUDENT IN AN ORGANIZED HEALTH CARE EDUCATION/TRAINING PROGRAM

## 2025-01-29 NOTE — PROGRESS NOTES
Hand, Upper Extremity and Reconstructive Surgery                Shavon Robbins MD                                         Summary of Upper Extremity Problems and Interventions     Diagnosis: Eval for right carpal tunnel syndrome    History of Present Illness     History of Present Illness  The patient presents for a follow-up evaluation of possible right carpal tunnel syndrome.  No changes in his symptoms.  Reports numbness intermittently.  He wears his brace at night when his symptoms are problematic.  Symptoms are fairly mild at this time      Alexander Harmon is a 59 y.o. right hand dominant male who presents with numbness and tingling in his right hand.  Patient reports numbness and tingling in his right hand starting approximately 1.5 years ago.  Patient wore a brace and took steroids and he said the symptoms improved.  Symptoms recurred approximately 2 to 3 months ago.  His symptoms of numbness are predominantly in his small finger, ring finger and thumb.  The symptoms are intermittent.  It is worse with activity.  It improves when he extends his elbow.  Denies nighttime symptoms.  Symptoms include pain and numbness.  He is taking Mobic for back pain already.    Pertinent past medical history:    Diabetes negative,  10/7 A1c 5.6   Thyroid disease negative   Autoimmune disease negative   Positive history of forearm fracture on the right, treated conservatively    Occupation:     Allergies     Allergies   Allergen Reactions    Latex Rash     Redness-sensitive    Oxycodone-Acetaminophen Rash       Home Medications     Current Outpatient Medications   Medication Instructions    Cholecalciferol (VITAMIN D3) 5000 UNITS TABS 1 tablet, Oral, DAILY    Dextromethorphan-Pyrilamine (CAPRON DMT) 30-30 MG TABS 1 tablet, Oral, 4 TIMES DAILY PRN    esomeprazole Magnesium (NEXIUM) 20 mg, Oral, DAILY    lisinopril-hydroCHLOROthiazide (PRINZIDE;ZESTORETIC) 20-12.5 MG per

## 2025-02-16 ENCOUNTER — OFFICE VISIT (OUTPATIENT)
Age: 60
End: 2025-02-16

## 2025-02-16 VITALS
TEMPERATURE: 99.5 F | DIASTOLIC BLOOD PRESSURE: 80 MMHG | OXYGEN SATURATION: 95 % | HEART RATE: 100 BPM | SYSTOLIC BLOOD PRESSURE: 140 MMHG

## 2025-02-16 DIAGNOSIS — R50.9 FEVER, UNSPECIFIED FEVER CAUSE: ICD-10-CM

## 2025-02-16 DIAGNOSIS — B96.89 BACTERIAL URI: Primary | ICD-10-CM

## 2025-02-16 DIAGNOSIS — R52 BODY ACHES: ICD-10-CM

## 2025-02-16 DIAGNOSIS — J06.9 BACTERIAL URI: Primary | ICD-10-CM

## 2025-02-16 LAB
INFLUENZA VIRUS A RNA: NORMAL
INFLUENZA VIRUS B RNA: NORMAL

## 2025-02-16 RX ORDER — AZITHROMYCIN 250 MG/1
TABLET, FILM COATED ORAL
Qty: 6 TABLET | Refills: 0 | Status: SHIPPED | OUTPATIENT
Start: 2025-02-16 | End: 2025-02-26

## 2025-02-16 RX ORDER — BENZONATATE 200 MG/1
200 CAPSULE ORAL 3 TIMES DAILY PRN
Qty: 30 CAPSULE | Refills: 0 | Status: SHIPPED | OUTPATIENT
Start: 2025-02-16 | End: 2025-02-26

## 2025-03-30 SDOH — ECONOMIC STABILITY: FOOD INSECURITY: WITHIN THE PAST 12 MONTHS, YOU WORRIED THAT YOUR FOOD WOULD RUN OUT BEFORE YOU GOT MONEY TO BUY MORE.: PATIENT DECLINED

## 2025-03-30 SDOH — ECONOMIC STABILITY: INCOME INSECURITY: IN THE LAST 12 MONTHS, WAS THERE A TIME WHEN YOU WERE NOT ABLE TO PAY THE MORTGAGE OR RENT ON TIME?: PATIENT DECLINED

## 2025-03-30 SDOH — ECONOMIC STABILITY: FOOD INSECURITY: WITHIN THE PAST 12 MONTHS, THE FOOD YOU BOUGHT JUST DIDN'T LAST AND YOU DIDN'T HAVE MONEY TO GET MORE.: PATIENT DECLINED

## 2025-03-30 SDOH — ECONOMIC STABILITY: TRANSPORTATION INSECURITY
IN THE PAST 12 MONTHS, HAS THE LACK OF TRANSPORTATION KEPT YOU FROM MEDICAL APPOINTMENTS OR FROM GETTING MEDICATIONS?: PATIENT DECLINED

## 2025-03-30 ASSESSMENT — PATIENT HEALTH QUESTIONNAIRE - PHQ9
1. LITTLE INTEREST OR PLEASURE IN DOING THINGS: NOT AT ALL
SUM OF ALL RESPONSES TO PHQ QUESTIONS 1-9: 0
SUM OF ALL RESPONSES TO PHQ QUESTIONS 1-9: 0
2. FEELING DOWN, DEPRESSED OR HOPELESS: NOT AT ALL
1. LITTLE INTEREST OR PLEASURE IN DOING THINGS: NOT AT ALL
2. FEELING DOWN, DEPRESSED OR HOPELESS: NOT AT ALL
SUM OF ALL RESPONSES TO PHQ QUESTIONS 1-9: 0
SUM OF ALL RESPONSES TO PHQ QUESTIONS 1-9: 0
SUM OF ALL RESPONSES TO PHQ9 QUESTIONS 1 & 2: 0

## 2025-03-31 ENCOUNTER — OFFICE VISIT (OUTPATIENT)
Dept: PRIMARY CARE CLINIC | Age: 60
End: 2025-03-31
Payer: COMMERCIAL

## 2025-03-31 VITALS
BODY MASS INDEX: 46.78 KG/M2 | OXYGEN SATURATION: 96 % | TEMPERATURE: 97.8 F | HEART RATE: 61 BPM | DIASTOLIC BLOOD PRESSURE: 84 MMHG | WEIGHT: 315 LBS | SYSTOLIC BLOOD PRESSURE: 138 MMHG

## 2025-03-31 DIAGNOSIS — R91.8 PULMONARY NODULES: ICD-10-CM

## 2025-03-31 DIAGNOSIS — E55.9 VITAMIN D DEFICIENCY: ICD-10-CM

## 2025-03-31 DIAGNOSIS — I10 ESSENTIAL HYPERTENSION: Primary | ICD-10-CM

## 2025-03-31 DIAGNOSIS — G47.33 OBSTRUCTIVE SLEEP APNEA SYNDROME: ICD-10-CM

## 2025-03-31 DIAGNOSIS — M47.816 OSTEOARTHRITIS OF LUMBAR SPINE, UNSPECIFIED SPINAL OSTEOARTHRITIS COMPLICATION STATUS: ICD-10-CM

## 2025-03-31 DIAGNOSIS — R73.03 PREDIABETES: ICD-10-CM

## 2025-03-31 DIAGNOSIS — K21.9 GASTROESOPHAGEAL REFLUX DISEASE WITHOUT ESOPHAGITIS: ICD-10-CM

## 2025-03-31 PROCEDURE — 3079F DIAST BP 80-89 MM HG: CPT | Performed by: STUDENT IN AN ORGANIZED HEALTH CARE EDUCATION/TRAINING PROGRAM

## 2025-03-31 PROCEDURE — 99214 OFFICE O/P EST MOD 30 MIN: CPT | Performed by: STUDENT IN AN ORGANIZED HEALTH CARE EDUCATION/TRAINING PROGRAM

## 2025-03-31 PROCEDURE — 3075F SYST BP GE 130 - 139MM HG: CPT | Performed by: STUDENT IN AN ORGANIZED HEALTH CARE EDUCATION/TRAINING PROGRAM

## 2025-03-31 NOTE — PROGRESS NOTES
PROGRESS NOTE   [facility]                                  [address], [address], [facility] [address]         Phone: [phone]    Date of Service:  3/31/2025     Patient ID: José Miguel Harmon is a 59 y.o. male      Subjective:     CC: 6 Month Chronic Care    HPI  Patient is a 59-year-old male with past medical history of hypertension, prediabetes, GT, pulmonary nodules, GERD, lumbar degenerative disc disease, history of melanoma, and anemia who presents in office for chronic care follow-up today.    Interval history:  Since last time patient was seen in office, he noted to have multiple prior URIs which had symptoms of sore throat and epistaxis after recurrent episodes of sneezing/blowing his nose.  He did get antibiotics for possible sinus infection.  He denied cough at that time but states that he had vocal changes.  Patient continues to be on meloxicam 1 time daily consistently for his lumbar degenerative disc disease which she feels is relatively well-controlled and is on PPI still.  We did review the patient's prior pulmonary nodules which he states in the past were thought to be secondary to histoplasmosis or underlying inflammatory pathology.  He did have multiple recurrent chest CT starting in the early 2000's at 6-month follow-up intervals.  Unclear if he saw pulmonology in the past.  He did have a history of skin cancer and states that dermatology had been following to ensure no metastases and states that there were no changes in nodules during that time.  He had a father passed away from lung cancer but was secondary to significant tobacco usage.  His last chest CT noted 2X 8 mm nodules back in 2019.      ROS:  Review of Systems   Constitutional:  Negative for chills, diaphoresis, fatigue and fever.   Respiratory:  Negative for cough, chest tightness, shortness of breath and wheezing.    Cardiovascular:  Negative for chest

## 2025-04-01 ASSESSMENT — ENCOUNTER SYMPTOMS
SHORTNESS OF BREATH: 0
DIARRHEA: 0
WHEEZING: 0
VOMITING: 0
CHEST TIGHTNESS: 0
COUGH: 0
BACK PAIN: 0
CONSTIPATION: 0
NAUSEA: 0
ABDOMINAL PAIN: 0

## 2025-05-24 DIAGNOSIS — M54.50 CHRONIC BILATERAL LOW BACK PAIN WITHOUT SCIATICA: ICD-10-CM

## 2025-05-24 DIAGNOSIS — G89.29 CHRONIC BILATERAL LOW BACK PAIN WITHOUT SCIATICA: ICD-10-CM

## 2025-05-27 RX ORDER — MELOXICAM 7.5 MG/1
TABLET ORAL
Qty: 60 TABLET | Refills: 4 | Status: SHIPPED | OUTPATIENT
Start: 2025-05-27

## 2025-05-27 NOTE — TELEPHONE ENCOUNTER
Refill Request     Last Seen: 3/31/2025    Last Written: 10/07/2024    Next Appointment:   Future Appointments   Date Time Provider Department Center   10/1/2025 10:00 AM Yu Ryan MD [facility]             Requested Prescriptions     Pending Prescriptions Disp Refills    meloxicam (MOBIC) 7.5 MG tablet 60 tablet 4     Sig: TAKE 1 TO 2 TABLETS BY MOUTH DAILY WITH FOOD AS NEEDED FOR BACK PAIN

## 2025-08-21 ENCOUNTER — OFFICE VISIT (OUTPATIENT)
Dept: PRIMARY CARE CLINIC | Age: 60
End: 2025-08-21
Payer: COMMERCIAL

## 2025-08-21 VITALS
WEIGHT: 315 LBS | SYSTOLIC BLOOD PRESSURE: 142 MMHG | HEART RATE: 57 BPM | OXYGEN SATURATION: 98 % | DIASTOLIC BLOOD PRESSURE: 78 MMHG | BODY MASS INDEX: 45.1 KG/M2 | HEIGHT: 70 IN

## 2025-08-21 DIAGNOSIS — M72.2 PLANTAR FASCIITIS: Primary | ICD-10-CM

## 2025-08-21 DIAGNOSIS — Z98.890 HX OF MELANOMA EXCISION: ICD-10-CM

## 2025-08-21 DIAGNOSIS — M79.672 PAIN OF LEFT HEEL: ICD-10-CM

## 2025-08-21 DIAGNOSIS — I10 ESSENTIAL (PRIMARY) HYPERTENSION: ICD-10-CM

## 2025-08-21 DIAGNOSIS — E66.813 CLASS 3 SEVERE OBESITY DUE TO EXCESS CALORIES WITH BODY MASS INDEX (BMI) OF 45.0 TO 49.9 IN ADULT (HCC): ICD-10-CM

## 2025-08-21 DIAGNOSIS — Z85.820 HX OF MELANOMA EXCISION: ICD-10-CM

## 2025-08-21 DIAGNOSIS — M47.816 OSTEOARTHRITIS OF LUMBAR SPINE, UNSPECIFIED SPINAL OSTEOARTHRITIS COMPLICATION STATUS: ICD-10-CM

## 2025-08-21 PROCEDURE — 99213 OFFICE O/P EST LOW 20 MIN: CPT

## 2025-08-21 PROCEDURE — 3077F SYST BP >= 140 MM HG: CPT

## 2025-08-21 PROCEDURE — 3078F DIAST BP <80 MM HG: CPT

## 2025-08-21 RX ORDER — METHYLPREDNISOLONE 4 MG/1
TABLET ORAL
Qty: 21 TABLET | Refills: 0 | Status: SHIPPED | OUTPATIENT
Start: 2025-08-21 | End: 2025-08-27

## 2025-08-21 SDOH — HEALTH STABILITY: PHYSICAL HEALTH: ON AVERAGE, HOW MANY MINUTES DO YOU ENGAGE IN EXERCISE AT THIS LEVEL?: 0 MIN

## 2025-08-21 SDOH — HEALTH STABILITY: PHYSICAL HEALTH: ON AVERAGE, HOW MANY DAYS PER WEEK DO YOU ENGAGE IN MODERATE TO STRENUOUS EXERCISE (LIKE A BRISK WALK)?: 0 DAYS

## 2025-08-24 PROBLEM — M47.817 LUMBOSACRAL SPONDYLOSIS WITHOUT MYELOPATHY: Status: ACTIVE | Noted: 2018-01-17

## 2025-08-24 PROBLEM — R91.8 LUNG FIELD ABNORMAL: Status: ACTIVE | Noted: 2019-10-28

## 2025-08-24 PROBLEM — K59.00 CONSTIPATION: Status: ACTIVE | Noted: 2025-08-24

## 2025-08-24 PROBLEM — R19.5 ABNORMAL FECES: Status: ACTIVE | Noted: 2025-08-24

## 2025-08-24 PROBLEM — Z85.820 HISTORY OF MALIGNANT MELANOMA OF SKIN: Status: ACTIVE | Noted: 2017-09-27

## 2025-08-24 PROBLEM — H91.90 UNSPECIFIED HEARING LOSS, UNSPECIFIED EAR: Status: ACTIVE | Noted: 2017-09-28

## 2025-08-24 PROBLEM — K64.4 RESIDUAL HEMORRHOIDAL SKIN TAGS: Status: ACTIVE | Noted: 2019-02-06

## 2025-08-24 PROBLEM — K63.5 POLYP OF COLON: Status: ACTIVE | Noted: 2025-08-24

## 2025-08-24 PROBLEM — Z86.0100 HISTORY OF COLONIC POLYPS: Status: ACTIVE | Noted: 2019-10-28

## 2025-08-24 PROBLEM — K64.9 HEMORRHOIDS: Status: ACTIVE | Noted: 2019-02-06

## 2025-08-24 PROBLEM — K22.4 ESOPHAGEAL SPASM: Status: ACTIVE | Noted: 2023-09-08

## 2025-08-24 PROBLEM — Z12.11 ENCOUNTER FOR SCREENING FOR MALIGNANT NEOPLASM OF COLON: Status: RESOLVED | Noted: 2025-08-24 | Resolved: 2025-08-24

## (undated) DEVICE — ESOPHAGEAL/PYLORIC/COLONIC/BILIARY WIREGUIDED BALLOON DILATATION CATHETER: Brand: CRE™ PRO

## (undated) DEVICE — CONMED SCOPE SAVER BITE BLOCK, 20X27 MM: Brand: SCOPE SAVER

## (undated) DEVICE — ENDOSCOPIC KIT 2 12 FT OP4 DE2 GWN SYR

## (undated) DEVICE — FORCEPS BX 240CM 2.4MM L NDL RAD JAW 4 M00513334

## (undated) DEVICE — SNARE VASC L240CM LOOP W10MM SHTH DIA2.4MM RND STIFF CLD

## (undated) DEVICE — YANKAUER,BULB TIP,W/O VENT,RIGID,STERILE: Brand: MEDLINE

## (undated) DEVICE — CANNULA,OXY,ADULT,SUPERSOFT,W/7'TUB,SC: Brand: MEDLINE INDUSTRIES, INC.

## (undated) DEVICE — CONTAINER SPEC 480ML CLR POLYSTYR 10% NEUT BUFF FRMLN ZN

## (undated) DEVICE — ENDOSCOPY KIT: Brand: MEDLINE INDUSTRIES, INC.

## (undated) DEVICE — SYRINGE INFL 60ML DISP ALLIANCE II

## (undated) DEVICE — TRAP POLYP ETRAP